# Patient Record
Sex: FEMALE | Race: OTHER | HISPANIC OR LATINO | Employment: FULL TIME | URBAN - METROPOLITAN AREA
[De-identification: names, ages, dates, MRNs, and addresses within clinical notes are randomized per-mention and may not be internally consistent; named-entity substitution may affect disease eponyms.]

---

## 2017-01-03 ENCOUNTER — NON-PROVIDER VISIT (OUTPATIENT)
Dept: MEDICAL GROUP | Facility: MEDICAL CENTER | Age: 37
End: 2017-01-03
Payer: COMMERCIAL

## 2017-01-03 DIAGNOSIS — Z30.42 ENCOUNTER FOR MANAGEMENT AND INJECTION OF INJECTABLE PROGESTIN CONTRACEPTIVE: ICD-10-CM

## 2017-01-03 PROCEDURE — 96372 THER/PROPH/DIAG INJ SC/IM: CPT | Performed by: NURSE PRACTITIONER

## 2017-01-03 RX ORDER — MEDROXYPROGESTERONE ACETATE 150 MG/ML
150 INJECTION, SUSPENSION INTRAMUSCULAR ONCE
Status: COMPLETED | OUTPATIENT
Start: 2017-01-03 | End: 2017-01-03

## 2017-01-03 RX ADMIN — MEDROXYPROGESTERONE ACETATE 150 MG: 150 INJECTION, SUSPENSION INTRAMUSCULAR at 15:59

## 2017-01-03 NOTE — MR AVS SNAPSHOT
Christal Roblero   1/3/2017 2:30 PM   Appointment   MRN: [id number]    Department:  [address]   Dept Phone:  [phone]    Description:  Female : 1980   Provider:  [facility]           Allergies as of 1/3/2017     Allergen Noted Reactions    Nkda [No Known Drug Allergy] 2013         Vital Signs     Smoking Status                   Never Smoker            Basic Information     Date Of Birth Sex Race Ethnicity Preferred Language    1980 Female  or   Origin (Romanian,Ethiopian,Macedonian,Singaporean, etc) English      Your appointments     2017  1:20 PM   [facility] Visit with [facility]   [facility] (--)    [address], [address]  [facility] [address] [phone]           You will be receiving a confirmation call a few days before your appointment from our automated call confirmation system.              Problem List              ICD-10-CM Priority Class Noted - Resolved    Personal history of gout Z87.39   2011 - Present    Anxiety disorder F41.9   2013 - Present    Intractable migraine without aura and without status migrainosus G43.019   2015 - Present      R47.01   9/3/2015 - Present    Hypotension I95.9   2015 - Present    Migraine with aura and without status migrainosus, not intractable G43.109   2016 - Present    Low back pain M54.5   5/10/2016 - Present    Encounter for management and injection of injectable progestin contraceptive Z30.42   10/4/2016 - Present    Chronic migraine G43.709   2016 - Present      Health Maintenance        Date Due Completion Dates    IMM DTaP/Tdap/Td Vaccine (1 - Tdap) 2011    IMM INFLUENZA (1) 2016 ---    PAP SMEAR 2018, 2013 (Done)    [facility]            Current Immunizations     TD Vaccine 2011      Below and/or attached are the medications your provider expects you to  take. Review all of your home medications and newly ordered medications with your provider and/or pharmacist. Follow medication instructions as directed by your provider and/or pharmacist. Please keep your medication list with you and share with your provider. Update the information when medications are discontinued, doses are changed, or new medications (including over-the-counter products) are added; and carry medication information at all times in the event of emergency situations     Allergies:  NKDA - (reactions not documented)               Medications  Valid as of: January 03, 2017 -  2:58 PM    Generic Name Brand Name Tablet Size Instructions for use    ALPRAZolam (Tab) XANAX 0.5 MG Take 1 Tab by mouth 2 times a day as needed for Anxiety (panic attacks).        Amitriptyline HCl (Tab) ELAVIL 50 MG 1 tab in evening for headache        Butalbital-APAP-Caff-Cod (Cap) FIORICET W/CODEINE -04-30 MG Take 1 Cap by mouth every four hours as needed for Headache.        Butalbital-APAP-Caff-Cod (Cap) FIORICET W/CODEINE -30-30 MG Take 1 Cap by mouth every four hours as needed for Headache.        Hydrocodone-Acetaminophen (Tab) NORCO 7.5-325 MG Take 1 Tab by mouth every 6 hours as needed.        MethylPREDNISolone (Kit) MEDROL DOSEPACK 4 MG Take as directed        MethylPREDNISolone (Tablet Therapy Pack) MEDROL DOSEPAK 4 MG Take as directed        PredniSONE (Tab) PredniSONE (Rosalio) 5 MG Take as directed for taper        PredniSONE (Tablet Therapy Pack) PredniSONE 5 MG (48) Take 1 Tab by mouth See Admin Instructions.        Promethazine HCl (Tab) PHENERGAN 25 MG 25 mg phenergan +/- 25 mg benadryl +/- excedrin migraine.  Max one phenergan per day.        QUEtiapine Fumarate (Tab) SEROQUEL 50 MG TAKE THREE TABLETS BY MOUTH ONCE DAILY AT BEDTIME        QUEtiapine Fumarate (Tab) SEROQUEL 50 MG Take 3 Tabs by mouth every bedtime.        Venlafaxine HCl (CAPSULE SR 24 HR) EFFEXOR- MG TAKE ONE CAPSULE BY MOUTH  ONCE DAILY        .                 Medicines prescribed today were sent to:     [facility] [address] [place] [address], [place] - [address] [facility]    [address] [facility] [place] [address]    Phone: [phone] Fax: [phone]    Open 24 Hours?: No      Medication refill instructions:       If your prescription bottle indicates you have medication refills left, it is not necessary to call your provider’s office. Please contact your pharmacy and they will refill your medication.    If your prescription bottle indicates you do not have any refills left, you may request refills at any time through one of the following ways: The online TATE'S LIST system (except Urgent Care), by calling your provider’s office, or by asking your pharmacy to contact your provider’s office with a refill request. Medication refills are processed only during regular business hours and may not be available until the next business day. Your provider may request additional information or to have a follow-up visit with you prior to refilling your medication.   *Please Note: Medication refills are assigned a new Rx number when refilled electronically. Your pharmacy may indicate that no refills were authorized even though a new prescription for the same medication is available at the pharmacy. Please request the medicine by name with the pharmacy before contacting your provider for a refill.           TATE'S LIST Access Code: Activation code not generated  Current TATE'S LIST Status: Active

## 2017-01-04 NOTE — PROGRESS NOTES
Christal Roblero is a 36 y.o. here for a Depo Provera Injection.     Date of last Depo Provera Injection: 10/4/16  Current date within therapeutic range?: Yes   Urine pregnancy test done (needed if out of date range): no  Date of office visit:11/3/16  Date of last pap (if > 21 years old)/ GYN exam: 7/20/15  Dx: Contraceptive use    Patient tolerated injection and no adverse effects were observed or reported yes.    # of Administrations remaining in MAR:0  Next injection due between 3/21/17 and 4/4/17.

## 2017-01-10 ENCOUNTER — TELEPHONE (OUTPATIENT)
Dept: NEUROLOGY | Facility: MEDICAL CENTER | Age: 37
End: 2017-01-10

## 2017-01-10 NOTE — TELEPHONE ENCOUNTER
----- Message from Afua Roblero PA-C sent at 1/5/2017  2:36 PM PST -----  Regarding: FW: Referral  Can you call the patient and advise that the patient needs to have their PCP recheck inflammatory markers and if they are elevated then Dr. Maldonado will re-evaluate but I cannot make referral at this time - it is declined by Dr. Maldonado.     ----- Message -----     From: Carina Gonsalez     Sent: 1/5/2017   2:00 PM       To: Afua Roblero PA-C  Subject: Referral                                         Good afternoon Afua Roblero.    I wanted to notify you that the referral for the above pt to [facility] has been declined at this time.  Dr. Maldonado reviewed the records and is recommending to re-check inflammatory markers.  If they are elevated, she will be happy to re-evaluate.  If you have any additional questions, please reach out to one of our providers. Thank you for the referral.

## 2017-02-17 DIAGNOSIS — F41.1 GENERALIZED ANXIETY DISORDER: ICD-10-CM

## 2017-02-21 RX ORDER — VENLAFAXINE HYDROCHLORIDE 150 MG/1
CAPSULE, EXTENDED RELEASE ORAL
Qty: 90 CAP | Refills: 0 | Status: SHIPPED | OUTPATIENT
Start: 2017-02-21 | End: 2017-07-03 | Stop reason: SDUPTHER

## 2017-02-21 RX ORDER — ALPRAZOLAM 0.5 MG/1
0.5 TABLET ORAL 2 TIMES DAILY PRN
Qty: 15 TAB | Refills: 0 | Status: SHIPPED
Start: 2017-02-21 | End: 2017-07-20 | Stop reason: SDUPTHER

## 2017-02-21 NOTE — TELEPHONE ENCOUNTER
Was the patient seen in the last year in this department? Yes  Last Fill 9/7/16 #30    Does patient have an active prescription for medications requested? No     Received Request Via: Pharmacy

## 2017-03-03 ENCOUNTER — OFFICE VISIT (OUTPATIENT)
Dept: NEUROLOGY | Facility: MEDICAL CENTER | Age: 37
End: 2017-03-03
Payer: COMMERCIAL

## 2017-03-03 VITALS
WEIGHT: 224 LBS | TEMPERATURE: 97.9 F | SYSTOLIC BLOOD PRESSURE: 114 MMHG | OXYGEN SATURATION: 93 % | BODY MASS INDEX: 37.32 KG/M2 | HEIGHT: 65 IN | DIASTOLIC BLOOD PRESSURE: 80 MMHG | HEART RATE: 96 BPM

## 2017-03-03 DIAGNOSIS — G43.019 INTRACTABLE MIGRAINE WITHOUT AURA AND WITHOUT STATUS MIGRAINOSUS: ICD-10-CM

## 2017-03-03 DIAGNOSIS — Z13.9 SCREENING: ICD-10-CM

## 2017-03-03 PROCEDURE — 99214 OFFICE O/P EST MOD 30 MIN: CPT | Performed by: PHYSICIAN ASSISTANT

## 2017-03-03 NOTE — MR AVS SNAPSHOT
"        Christal Damien Osbaldo   3/3/2017 11:00 AM   Office Visit   MRN: [id number]    Department:  Neurology [facility]   Dept Phone:  [phone]    Description:  Female : 1980   Provider:  Afua Roblero PA-C           Reason for Visit     Follow-Up migraines      Allergies as of 3/3/2017     Allergen Noted Reactions    Nkda [No Known Drug Allergy] 2013         You were diagnosed with     Screening   [441665]       Intractable migraine without aura and without status migrainosus   [918032]         Vital Signs     Blood Pressure Pulse Temperature Height Weight Body Mass Index    114/80 mmHg 96 36.6 °C (97.9 °F) 1.651 m (5' 5\") 101.606 kg (224 lb) 37.28 kg/m2    Oxygen Saturation Smoking Status                93% Never Smoker           Basic Information     Date Of Birth Sex Race Ethnicity Preferred Language    1980 Female  or   Origin (Thai,Citizen of the [place],Namibian,Malagasy, etc) English      Your appointments     2017  2:00 PM   Follow [facility] Visit [facility]   [facility] (--)    [address] [place] [address], [address]  [facility] [id number] [phone]           You will be receiving a confirmation call a few days before your appointment from our automated call confirmation system.              Problem List              ICD-10-CM Priority Class Noted - Resolved    Personal history of gout Z87.39   2011 - Present    Anxiety disorder F41.9   2013 - Present    Intractable migraine without aura and without status migrainosus G43.019   2015 - Present      R47.01   9/3/2015 - Present    Hypotension I95.9   2015 - Present    Migraine with aura and without status migrainosus, not intractable G43.109   2016 - Present    Low back pain M54.5   5/10/2016 - Present    Encounter for management and injection of injectable progestin contraceptive Z30.42   10/4/2016 - Present    Chronic migraine G43.709   2016 - Present      Health Maintenance   "       Date Due Completion Dates    IMM DTaP/Tdap/Td Vaccine (1 - Tdap) 8/1/2011 7/31/2011    IMM INFLUENZA (1) 9/1/2016 ---    PAP SMEAR 7/20/2018 7/20/2015, 6/24/2013 (Done)    [facility]            Current Immunizations     TD Vaccine 7/31/2011      Below and/or attached are the medications your provider expects you to take. Review all of your home medications and newly ordered medications with your provider and/or pharmacist. Follow medication instructions as directed by your provider and/or pharmacist. Please keep your medication list with you and share with your provider. Update the information when medications are discontinued, doses are changed, or new medications (including over-the-counter products) are added; and carry medication information at all times in the event of emergency situations     Allergies:  NKDA - (reactions not documented)               Medications  Valid as of: March 03, 2017 -  1:41 PM    Generic Name Brand Name Tablet Size Instructions for use    ALPRAZolam (Tab) XANAX 0.5 MG Take 1 Tab by mouth 2 times a day as needed for Anxiety (panic attacks).        Amitriptyline HCl (Tab) ELAVIL 50 MG 1 tab in evening for headache        Butalbital-APAP-Caff-Cod (Cap) FIORICET W/CODEINE -54-30 MG Take 1 Cap by mouth every four hours as needed for Headache.        Butalbital-APAP-Caff-Cod (Cap) FIORICET W/CODEINE -28-30 MG Take 1 Cap by mouth every four hours as needed for Headache.        Hydrocodone-Acetaminophen (Tab) NORCO 7.5-325 MG Take 1 Tab by mouth every 6 hours as needed.        MethylPREDNISolone (Kit) MEDROL DOSEPACK 4 MG Take as directed        MethylPREDNISolone (Tablet Therapy Pack) MEDROL DOSEPAK 4 MG Take as directed        PredniSONE (Tab) PredniSONE (Rosalio) 5 MG Take as directed for taper        PredniSONE (Tablet Therapy Pack) PredniSONE 5 MG (48) Take 1 Tab by mouth See Admin Instructions.        Promethazine HCl (Tab) PHENERGAN  25 MG 25 mg phenergan +/- 25 mg benadryl +/- excedrin migraine.  Max one phenergan per day.        QUEtiapine Fumarate (Tab) SEROQUEL 50 MG TAKE THREE TABLETS BY MOUTH ONCE DAILY AT BEDTIME        QUEtiapine Fumarate (Tab) SEROQUEL 50 MG Take 3 Tabs by mouth every bedtime.        SUMAtriptan Succinate (Solution Auto-injector) SUMAtriptan Succinate 3 MG/0.5ML Inject 1 Application as instructed as needed.        Venlafaxine HCl (CAPSULE SR 24 HR) EFFEXOR- MG TAKE ONE CAPSULE BY MOUTH ONCE DAILY        .                 Medicines prescribed today were sent to:     [facility] [address] [place] [address] - [address] [place] [address] [facility] [address]    Phone: [phone] Fax: [phone]    Open 24 Hours?: No    [facility] [address] [place] [address]    [address] [address]    Phone: [phone] Fax: [phone]    Open 24 Hours?: No      Medication refill instructions:       If your prescription bottle indicates you have medication refills left, it is not necessary to call your provider’s office. Please contact your pharmacy and they will refill your medication.    If your prescription bottle indicates you do not have any refills left, you may request refills at any time through one of the following ways: The online [employer] system (except Urgent Care), by calling your provider’s office, or by asking your pharmacy to contact your provider’s office with a refill request. Medication refills are processed only during regular business hours and may not be available until the next business day. Your provider may request additional information or to have a follow-up visit with you prior to refilling your medication.   *Please Note: Medication refills are assigned a new Rx number when refilled electronically. Your pharmacy may indicate that no refills were authorized even though a new prescription for the same medication is available at the pharmacy. Please request the  medicine by name with the pharmacy before contacting your provider for a refill.        Your To Do List     Future Labs/Procedures Complete By Expires    EKG  As directed 3/3/2018    EKG  As directed 3/3/2018         MyChart Access Code: Activation code not generated  Current [employer] Status: Active

## 2017-03-03 NOTE — PROGRESS NOTES
"Subjective:      Christal Roblero is a 36 y.o. female who presents with Follow-Up  cc:  Headaches/migraines    Elavil - doesn't seem to really be working.  Cut back now to just 25 mg at bedtime.  Continues to struggle with too many headaches and elavil side effects.    We have tried to prescribe multiple abortive therapies but patient can never afford them as she says they are too expensive.      Lengthy discussion with patient about need to come up with a plan as narcotics are not the answer and she is just making her self miserable with development of refractory headaches.    She is willing to try again to find daily medication because what are our options??            HPI    ROS       Objective:     /80 mmHg  Pulse 96  Temp(Src) 36.6 °C (97.9 °F)  Ht 1.651 m (5' 5\")  Wt 101.606 kg (224 lb)  BMI 37.28 kg/m2  SpO2 93%     Physical Exam            Assessment/Plan:   Migraine with and without aura/medication overuse headache/chronic migraine    EKG first and if OK we will start verapamil  Try zembrace if insurance will authorize - if not migranal 3 sprays through outside pharmacy    Total time with this visit:  25   Minutes face-to-face with patient. More than 50% of this visit was spent educating patient on their illness and/or coordinating care, as detailed above        "

## 2017-03-24 ENCOUNTER — HOSPITAL ENCOUNTER (OUTPATIENT)
Dept: CARDIOLOGY | Facility: MEDICAL CENTER | Age: 37
End: 2017-03-24
Attending: PHYSICIAN ASSISTANT
Payer: COMMERCIAL

## 2017-03-24 LAB — EKG IMPRESSION: NORMAL

## 2017-03-24 PROCEDURE — 93005 ELECTROCARDIOGRAM TRACING: CPT | Performed by: PHYSICIAN ASSISTANT

## 2017-03-24 PROCEDURE — 93010 ELECTROCARDIOGRAM REPORT: CPT | Performed by: INTERNAL MEDICINE

## 2017-03-28 ENCOUNTER — PATIENT MESSAGE (OUTPATIENT)
Dept: NEUROLOGY | Facility: MEDICAL CENTER | Age: 37
End: 2017-03-28

## 2017-03-28 DIAGNOSIS — G43.019 INTRACTABLE MIGRAINE WITHOUT AURA AND WITHOUT STATUS MIGRAINOSUS: ICD-10-CM

## 2017-03-28 RX ORDER — VERAPAMIL HYDROCHLORIDE 40 MG/1
40 TABLET ORAL 2 TIMES DAILY
Qty: 60 TAB | Refills: 11 | Status: SHIPPED | OUTPATIENT
Start: 2017-03-28 | End: 2017-04-17

## 2017-03-28 NOTE — TELEPHONE ENCOUNTER
From: Christal Roblero  To: Afua Roblero PA-C  Sent: 3/28/2017 2:51 PM PDT  Subject: RE: Prescription Question    Sounds good will you be sending the prescription to the [facility] pharmacy or do I go  the prescription from you?    ----- Message -----  From: Afua Roblero PA-C  Sent: 3/28/17, 2:36 PM  To: Christal Roblero  Subject: RE: Prescription Question    EKG was just fine.    We will start verapamil 40 mg twice daily (AM and PM). We will slowly titrate as needed but this is starting dose. Begin keeping headache diary and remember goal of daily med is to reduce headache frequency by 50%.    Peg      ----- Message -----   From: CHRISTAL ROBLERO   Sent: 3/28/2017 9:12 AM PDT   To: Afua Roblero PA-C  Subject: Prescription Question    Good morning I got my EKG on Friday. Not sure if you got the results yet so I can start the new medication. Please let me know if I need to do anything for it.

## 2017-03-31 ENCOUNTER — NON-PROVIDER VISIT (OUTPATIENT)
Dept: MEDICAL GROUP | Facility: LAB | Age: 37
End: 2017-03-31
Payer: COMMERCIAL

## 2017-03-31 DIAGNOSIS — Z30.42 ENCOUNTER FOR MANAGEMENT AND INJECTION OF INJECTABLE PROGESTIN CONTRACEPTIVE: ICD-10-CM

## 2017-03-31 PROCEDURE — 96372 THER/PROPH/DIAG INJ SC/IM: CPT | Performed by: FAMILY MEDICINE

## 2017-03-31 RX ORDER — MEDROXYPROGESTERONE ACETATE 150 MG/ML
150 INJECTION, SUSPENSION INTRAMUSCULAR ONCE
Status: COMPLETED | OUTPATIENT
Start: 2017-03-31 | End: 2017-03-31

## 2017-03-31 RX ADMIN — MEDROXYPROGESTERONE ACETATE 150 MG: 150 INJECTION, SUSPENSION INTRAMUSCULAR at 11:25

## 2017-03-31 NOTE — MR AVS SNAPSHOT
Christal Roblero   3/31/2017 10:40 AM   Non-Provider Visit   MRN: [id number]    Department:  [facility]   Dept Phone:  [phone]    Description:  Female : 1980   Provider:  JUICE PATEL MA           Reason for Visit     Injection Depo      Allergies as of 3/31/2017     Allergen Noted Reactions    Nkda [No Known Drug Allergy] 2013         You were diagnosed with     Encounter for management and injection of injectable progestin contraceptive   [548005]         Vital Signs     Smoking Status                   Never Smoker            Basic Information     Date Of Birth Sex Race Ethnicity Preferred Language    1980 Female  or   Origin (Anguillan,Tunisian,Citizen of [place],Greek, etc) English      Your appointments     2017  2:00 PM   [facility]   [facility] (--)    [address] [facility], [address]  [facility] [address] [phone]           You will be receiving a confirmation call a few days before your appointment from our automated call confirmation system.              Problem List              ICD-10-CM Priority Class Noted - Resolved    Personal history of gout Z87.39   2011 - Present    Anxiety disorder F41.9   2013 - Present    Intractable migraine without aura and without status migrainosus G43.019   2015 - Present      R47.01   9/3/2015 - Present    Hypotension I95.9   2015 - Present    Migraine with aura and without status migrainosus, not intractable G43.109   2016 - Present    Low back pain M54.5   5/10/2016 - Present    Encounter for management and injection of injectable progestin contraceptive Z30.42   10/4/2016 - Present    Chronic migraine G43.709   2016 - Present      Health Maintenance        Date Due Completion Dates    IMM DTaP/Tdap/Td Vaccine (1 - Tdap) 2011    IMM INFLUENZA (1) 2016 ---    PAP SMEAR 2018, 2013 (Done)    [facility]            Current Immunizations     TD Vaccine 7/31/2011      Below and/or attached are the medications your provider expects you to take. Review all of your home medications and newly ordered medications with your provider and/or pharmacist. Follow medication instructions as directed by your provider and/or pharmacist. Please keep your medication list with you and share with your provider. Update the information when medications are discontinued, doses are changed, or new medications (including over-the-counter products) are added; and carry medication information at all times in the event of emergency situations     Allergies:  NKDA - (reactions not documented)               Medications  Valid as of: March 31, 2017 - 11:29 AM    Generic Name Brand Name Tablet Size Instructions for use    ALPRAZolam (Tab) XANAX 0.5 MG Take 1 Tab by mouth 2 times a day as needed for Anxiety (panic attacks).        Amitriptyline HCl (Tab) ELAVIL 50 MG 1 tab in evening for headache        Butalbital-APAP-Caff-Cod (Cap) FIORICET W/CODEINE -82-30 MG Take 1 Cap by mouth every four hours as needed for Headache.        Butalbital-APAP-Caff-Cod (Cap) FIORICET W/CODEINE -09-30 MG Take 1 Cap by mouth every four hours as needed for Headache.        Hydrocodone-Acetaminophen (Tab) NORCO 7.5-325 MG Take 1 Tab by mouth every 6 hours as needed.        MethylPREDNISolone (Kit) MEDROL DOSEPACK 4 MG Take as directed        MethylPREDNISolone (Tablet Therapy Pack) MEDROL DOSEPAK 4 MG Take as directed        PredniSONE (Tab) PredniSONE (Rosalio) 5 MG Take as directed for taper        PredniSONE (Tablet Therapy Pack) PredniSONE 5 MG (48) Take 1 Tab by mouth See Admin Instructions.        Promethazine HCl (Tab) PHENERGAN 25 MG 25 mg phenergan +/- 25 mg benadryl +/- excedrin migraine.  Max one phenergan per day.        QUEtiapine Fumarate (Tab) SEROQUEL 50 MG TAKE THREE TABLETS BY MOUTH ONCE DAILY  AT BEDTIME        QUEtiapine Fumarate (Tab) SEROQUEL 50 MG Take 3 Tabs by mouth every bedtime.        SUMAtriptan Succinate (Solution Auto-injector) SUMAtriptan Succinate 3 MG/0.5ML Inject 1 Application as instructed as needed.        Venlafaxine HCl (CAPSULE SR 24 HR) EFFEXOR- MG TAKE ONE CAPSULE BY MOUTH ONCE DAILY        Verapamil HCl (Tab) ISOPTIN 40 MG Take 1 Tab by mouth 2 Times a Day. For migraine prevention.        .                 Medicines prescribed today were sent to:     [facility] [address] [place], [place] - [address] [facility]    [address] [facility] [address]    Phone: [phone] Fax: [phone]    Open 24 Hours?: No    [facility] - [place], [place] - [address] [facility]    [address] [address]    Phone: [phone] Fax: [phone]    Open 24 Hours?: No      Medication refill instructions:       If your prescription bottle indicates you have medication refills left, it is not necessary to call your provider’s office. Please contact your pharmacy and they will refill your medication.    If your prescription bottle indicates you do not have any refills left, you may request refills at any time through one of the following ways: The online [employer] system (except Urgent Care), by calling your provider’s office, or by asking your pharmacy to contact your provider’s office with a refill request. Medication refills are processed only during regular business hours and may not be available until the next business day. Your provider may request additional information or to have a follow-up visit with you prior to refilling your medication.   *Please Note: Medication refills are assigned a new Rx number when refilled electronically. Your pharmacy may indicate that no refills were authorized even though a new prescription for the same medication is available at the pharmacy. Please request the medicine by name with the pharmacy before contacting your provider for a  refill.           MyChart Access Code: Activation code not generated  Current Organizer Status: Active

## 2017-03-31 NOTE — PROGRESS NOTES
Christal Roblero is a 36 y.o. here for a Depo Provera Injection.     Date of last Depo Provera Injection: contraception  Current date within therapeutic range?: Yes   Urine pregnancy test done (needed if out of date range): no  Date of office visit:3-31-17  Date of last pap (if > 21 years old)/ GYN exam: 7-20-15  Dx: Contraceptive use    Patient tolerated injection and no adverse effects were observed or reported.    # of Administrations remaining in MAR:0  Next injection due between 6-16-17 and 6-30-17.

## 2017-04-04 ENCOUNTER — TELEPHONE (OUTPATIENT)
Dept: MEDICAL GROUP | Facility: LAB | Age: 37
End: 2017-04-04

## 2017-04-11 ENCOUNTER — HOSPITAL ENCOUNTER (OUTPATIENT)
Dept: RADIOLOGY | Facility: MEDICAL CENTER | Age: 37
End: 2017-04-11
Attending: NURSE PRACTITIONER
Payer: COMMERCIAL

## 2017-04-11 DIAGNOSIS — M54.16 LUMBAR RADICULOPATHY: ICD-10-CM

## 2017-04-11 PROCEDURE — 72110 X-RAY EXAM L-2 SPINE 4/>VWS: CPT

## 2017-04-17 ENCOUNTER — OFFICE VISIT (OUTPATIENT)
Dept: NEUROLOGY | Facility: MEDICAL CENTER | Age: 37
End: 2017-04-17
Payer: COMMERCIAL

## 2017-04-17 VITALS
HEIGHT: 65 IN | DIASTOLIC BLOOD PRESSURE: 84 MMHG | SYSTOLIC BLOOD PRESSURE: 120 MMHG | TEMPERATURE: 98.2 F | BODY MASS INDEX: 37.15 KG/M2 | OXYGEN SATURATION: 97 % | WEIGHT: 223 LBS | HEART RATE: 89 BPM

## 2017-04-17 DIAGNOSIS — G43.109 MIGRAINE WITH AURA AND WITHOUT STATUS MIGRAINOSUS, NOT INTRACTABLE: ICD-10-CM

## 2017-04-17 PROCEDURE — 99213 OFFICE O/P EST LOW 20 MIN: CPT | Performed by: PHYSICIAN ASSISTANT

## 2017-04-17 RX ORDER — SUMATRIPTAN SUCCINATE 4 MG/.5ML
1 INJECTION, SOLUTION SUBCUTANEOUS PRN
Qty: 3 ML | Refills: 11 | Status: SHIPPED | OUTPATIENT
Start: 2017-04-17 | End: 2017-11-29 | Stop reason: SDUPTHER

## 2017-04-17 RX ORDER — VERAPAMIL HYDROCHLORIDE 40 MG/1
TABLET ORAL
Qty: 120 TAB | Refills: 11 | Status: SHIPPED | OUTPATIENT
Start: 2017-04-17 | End: 2017-11-29

## 2017-04-17 NOTE — MR AVS SNAPSHOT
"        Chrsital Quezada Roblero   2017 2:00 PM   Office Visit   MRN: [id number]    Department:  [facility]   Dept Phone:  [phone]    Description:  Female : 1980   Provider:  Afua Roblero PA-C           Reason for Visit     Follow-Up migraine      Allergies as of 2017     Allergen Noted Reactions    Nkda [No Known Drug Allergy] 2013         You were diagnosed with     Migraine with aura and without status migrainosus, not intractable   [345939]       Chronic migraine   [207305]         Vital Signs     Blood Pressure Pulse Temperature Height Weight Body Mass Index    120/84 mmHg 89 36.8 °C (98.2 °F) 1.651 m (5' 5\") 101.152 kg (223 lb) 37.11 kg/m2    Oxygen Saturation Smoking Status                97% Never Smoker           Basic Information     Date Of Birth Sex Race Ethnicity Preferred Language    1980 Female  or   Origin (Polish,[place]ican,Citizen of [place], etc) English      Your appointments     2017 11:40 AM   Follow Up Visit with Afua Roblero PA-C   [facility] (--)    [address] [place] [address], [address]  [facility] [id number] [phone]           You will be receiving a confirmation call a few days before your appointment from our automated call confirmation system.              Problem List              ICD-10-CM Priority Class Noted - Resolved    Personal history of gout Z87.39   2011 - Present    Anxiety disorder F41.9   2013 - Present    Intractable migraine without aura and without status migrainosus G43.019   2015 - Present      R47.01   9/3/2015 - Present    Hypotension I95.9   2015 - Present    Migraine with aura and without status migrainosus, not intractable G43.109   2016 - Present    Low back pain M54.5   5/10/2016 - Present    Encounter for management and injection of injectable progestin contraceptive Z30.42   10/4/2016 - Present    Chronic migraine G43.709   2016 - Present      Health " Maintenance        Date Due Completion Dates    IMM DTaP/Tdap/Td Vaccine (1 - Tdap) 8/1/2011 7/31/2011    PAP SMEAR 7/20/2018 7/20/2015, 6/24/2013 (Done)    [facility]            Current Immunizations     TD Vaccine 7/31/2011      Below and/or attached are the medications your provider expects you to take. Review all of your home medications and newly ordered medications with your provider and/or pharmacist. Follow medication instructions as directed by your provider and/or pharmacist. Please keep your medication list with you and share with your provider. Update the information when medications are discontinued, doses are changed, or new medications (including over-the-counter products) are added; and carry medication information at all times in the event of emergency situations     Allergies:  NKDA - (reactions not documented)               Medications  Valid as of: April 17, 2017 -  2:15 PM    Generic Name Brand Name Tablet Size Instructions for use    ALPRAZolam (Tab) XANAX 0.5 MG Take 1 Tab by mouth 2 times a day as needed for Anxiety (panic attacks).        Amitriptyline HCl (Tab) ELAVIL 50 MG 1 tab in evening for headache        Butalbital-APAP-Caff-Cod (Cap) FIORICET W/CODEINE -32-30 MG Take 1 Cap by mouth every four hours as needed for Headache.        Butalbital-APAP-Caff-Cod (Cap) FIORICET W/CODEINE -80-30 MG Take 1 Cap by mouth every four hours as needed for Headache.        Hydrocodone-Acetaminophen (Tab) NORCO 7.5-325 MG Take 1 Tab by mouth every 6 hours as needed.        MethylPREDNISolone (Kit) MEDROL DOSEPACK 4 MG Take as directed        MethylPREDNISolone (Tablet Therapy Pack) MEDROL DOSEPAK 4 MG Take as directed        PredniSONE (Tab) PredniSONE (Rosalio) 5 MG Take as directed for taper        PredniSONE (Tablet Therapy Pack) PredniSONE 5 MG (48) Take 1 Tab by mouth See Admin Instructions.        Promethazine HCl (Tab) PHENERGAN 25 MG 25 mg phenergan  +/- 25 mg benadryl +/- excedrin migraine.  Max one phenergan per day.        QUEtiapine Fumarate (Tab) SEROQUEL 50 MG TAKE THREE TABLETS BY MOUTH ONCE DAILY AT BEDTIME        QUEtiapine Fumarate (Tab) SEROQUEL 50 MG Take 3 Tabs by mouth every bedtime.        SUMAtriptan Succinate (Solution Auto-injector) SUMAtriptan Succinate 4 MG/0.5ML Inject 1 Syringe as instructed as needed.        Venlafaxine HCl (CAPSULE SR 24 HR) EFFEXOR- MG TAKE ONE CAPSULE BY MOUTH ONCE DAILY        Verapamil HCl (Tab) ISOPTIN 40 MG One tab PM and 2 tabs AM x 2 weeks; then 2 tabs AM and 2 tabs PM.        .                 Medicines prescribed today were sent to:     [facility] [address] [place], [place] [address] [address] [address]    Phone: [phone] Fax: [phone]    Open 24 Hours?: No    [facility] [place] [address]    [address] [address]    Phone: [phone] Fax: [phone]    Open 24 Hours?: No      Medication refill instructions:       If your prescription bottle indicates you have medication refills left, it is not necessary to call your provider’s office. Please contact your pharmacy and they will refill your medication.    If your prescription bottle indicates you do not have any refills left, you may request refills at any time through one of the following ways: The online [employer] system (except Urgent Care), by calling your provider’s office, or by asking your pharmacy to contact your provider’s office with a refill request. Medication refills are processed only during regular business hours and may not be available until the next business day. Your provider may request additional information or to have a follow-up visit with you prior to refilling your medication.   *Please Note: Medication refills are assigned a new Rx number when refilled electronically. Your pharmacy may indicate that no refills were authorized even though a new  prescription for the same medication is available at the pharmacy. Please request the medicine by name with the pharmacy before contacting your provider for a refill.           MyChart Access Code: Activation code not generated  Current MyChart Status: Active

## 2017-05-03 RX ORDER — QUETIAPINE FUMARATE 50 MG/1
TABLET, FILM COATED ORAL
Qty: 90 TAB | Refills: 0 | Status: SHIPPED | OUTPATIENT
Start: 2017-05-03 | End: 2017-07-05

## 2017-05-08 ENCOUNTER — OFFICE VISIT (OUTPATIENT)
Dept: MEDICAL GROUP | Facility: LAB | Age: 37
End: 2017-05-08
Payer: COMMERCIAL

## 2017-05-08 VITALS
TEMPERATURE: 99.2 F | HEART RATE: 80 BPM | BODY MASS INDEX: 37.99 KG/M2 | WEIGHT: 228 LBS | DIASTOLIC BLOOD PRESSURE: 82 MMHG | SYSTOLIC BLOOD PRESSURE: 118 MMHG | RESPIRATION RATE: 12 BRPM | HEIGHT: 65 IN | OXYGEN SATURATION: 95 %

## 2017-05-08 DIAGNOSIS — H92.01 OTALGIA, RIGHT EAR: ICD-10-CM

## 2017-05-08 DIAGNOSIS — H61.23 BILATERAL IMPACTED CERUMEN: ICD-10-CM

## 2017-05-08 DIAGNOSIS — R05.8 NON-PRODUCTIVE COUGH: ICD-10-CM

## 2017-05-08 PROCEDURE — 99214 OFFICE O/P EST MOD 30 MIN: CPT | Performed by: FAMILY MEDICINE

## 2017-05-08 RX ORDER — AMOXICILLIN AND CLAVULANATE POTASSIUM 875; 125 MG/1; MG/1
1 TABLET, FILM COATED ORAL 2 TIMES DAILY
Qty: 20 TAB | Refills: 0 | Status: SHIPPED | OUTPATIENT
Start: 2017-05-08 | End: 2017-07-05

## 2017-05-08 RX ORDER — BENZONATATE 100 MG/1
100 CAPSULE ORAL 3 TIMES DAILY PRN
Qty: 60 CAP | Refills: 0 | Status: SHIPPED | OUTPATIENT
Start: 2017-05-08 | End: 2017-07-05

## 2017-05-08 ASSESSMENT — ENCOUNTER SYMPTOMS
RHINORRHEA: 0
HEADACHES: 1
COUGH: 1
SHORTNESS OF BREATH: 0

## 2017-05-08 ASSESSMENT — COPD QUESTIONNAIRES: COPD: 0

## 2017-05-08 NOTE — PROGRESS NOTES
Subjective:      Christal Roblero is a 36 y.o. female who presents with Cough and Nasal Congestion    Chief Complaint   Patient presents with   • Cough     x 4 days   • Nasal Congestion     x 4 days           Cough  This is a new problem. The current episode started in the past 7 days. The problem has been gradually worsening. The cough is non-productive. Associated symptoms include ear pain (right one ) and headaches. Pertinent negatives include no rhinorrhea or shortness of breath. Associated symptoms comments: Sinus congestion . The symptoms are aggravated by lying down. Treatments tried: robitussin dayquil, cough drops  The treatment provided no relief. There is no history of asthma, COPD or emphysema.     Patient is here with above. States her symptoms started on Thursday with a sore throat. By Friday midday she had a cough. She then developed sinus congestion the next day. She states that last night her ears started to pop and her right ear is a little ball painful. She states that her cough is nonproductive. She is not sure if she's had a fever or not because she is always cold. Her sore throat is gone but her throat is irritated when she coughs. Did have some diarrhea yesterday however this resolved.     Son sick last week with similar     No flu vaccine     Patient is a lifelong nonsmoker    Patient Active Problem List    Diagnosis Date Noted   • Chronic migraine 12/19/2016   • Encounter for management and injection of injectable progestin contraceptive 10/04/2016   • Low back pain 05/10/2016   • Migraine with aura and without status migrainosus, not intractable 04/22/2016   • Hypotension 09/07/2015   •   09/03/2015   • Intractable migraine without aura and without status migrainosus 08/25/2015   • Anxiety disorder 02/05/2013   • Personal history of gout 05/17/2011       Family History   Problem Relation Age of Onset   • Heart Disease Mother      irregular heartbeat   • Thyroid Sister      hypo        Social History     Social History   • Marital Status:      Spouse Name: N/A   • Number of Children: N/A   • Years of Education: N/A     Occupational History   • Not on file.     Social History Main Topics   • Smoking status: Never Smoker    • Smokeless tobacco: Never Used   • Alcohol Use: Yes      Comment: rare; on occasion    • Drug Use: No   • Sexual Activity:     Partners: Male     Birth Control/ Protection: Injection      Comment: ; two kids; wk: 2 jobs ( / warehouse)     Other Topics Concern   • Not on file     Social History Narrative       Current outpatient prescriptions:   •  quetiapine (SEROQUEL) 50 MG tablet, TAKE THREE TABLETS BY MOUTH ONCE DAILY AT BEDTIME, Disp: 90 Tab, Rfl: 0  •  SUMAtriptan Succinate 4 MG/0.5ML Solution Auto-injector, Inject 1 Syringe as instructed as needed., Disp: 3 mL, Rfl: 11  •  verapamil (ISOPTIN) 40 MG Tab, One tab PM and 2 tabs AM x 2 weeks; then 2 tabs AM and 2 tabs PM., Disp: 120 Tab, Rfl: 11  •  alprazolam (XANAX) 0.5 MG Tab, Take 1 Tab by mouth 2 times a day as needed for Anxiety (panic attacks)., Disp: 15 Tab, Rfl: 0  •  venlafaxine (EFFEXOR-XR) 150 MG extended-release capsule, TAKE ONE CAPSULE BY MOUTH ONCE DAILY, Disp: 90 Cap, Rfl: 0  •  quetiapine (SEROQUEL) 50 MG tablet, Take 3 Tabs by mouth every bedtime., Disp: 90 Tab, Rfl: 0  •  butalbital-acetaminophen-caffeine-codeine (FIORICET W/CODEINE) -79-30 MG per capsule, Take 1 Cap by mouth every four hours as needed for Headache., Disp: 20 Cap, Rfl: 3  •  butalbital-acetaminophen-caffeine-codeine (FIORICET W/CODEINE) -51-30 MG per capsule, Take 1 Cap by mouth every four hours as needed for Headache., Disp: 20 Cap, Rfl: 3  •  amitriptyline (ELAVIL) 50 MG Tab, 1 tab in evening for headache, Disp: 90 Tab, Rfl: 3  •  MethylPREDNISolone (MEDROL DOSEPAK) 4 MG Tablet Therapy Pack, Take as directed, Disp: 1 Kit, Rfl: 0  •  promethazine (PHENERGAN) 25 MG Tab, 25 mg phenergan +/- 25 mg  "benadryl +/- excedrin migraine.  Max one phenergan per day., Disp: 12 Tab, Rfl: 11  •  PredniSONE 5 MG (48) Tablet Therapy Pack, Take 1 Tab by mouth See Admin Instructions., Disp: 1 Each, Rfl: 3  •  methylPREDNISolone (MEDROL DOSEPACK) 4 MG tablet, Take as directed, Disp: 1 Kit, Rfl: 0  •  hydrocodone-acetaminophen (NORCO) 7.5-325 MG per tablet, Take 1 Tab by mouth every 6 hours as needed., Disp: 20 Tab, Rfl: 0  •  PredniSONE, Rosalio, 5 MG Tab, Take as directed for taper, Disp: 1 Tab, Rfl: 11    Review of Systems   HENT: Positive for ear pain (right one ). Negative for rhinorrhea.    Respiratory: Positive for cough. Negative for shortness of breath.    Neurological: Positive for headaches.          Objective:     /82 mmHg  Pulse 80  Temp(Src) 37.3 °C (99.2 °F)  Resp 12  Ht 1.651 m (5' 5\")  Wt 103.42 kg (228 lb)  BMI 37.94 kg/m2  SpO2 95%     Physical Exam   Constitutional: She appears well-developed and well-nourished. She is active and cooperative.  Non-toxic appearance. She has a sickly appearance. No distress.   HENT:   Head: Normocephalic and atraumatic.   Nose: Mucosal edema and rhinorrhea present. Right sinus exhibits no maxillary sinus tenderness and no frontal sinus tenderness. Left sinus exhibits no maxillary sinus tenderness and no frontal sinus tenderness.   Mouth/Throat: Uvula is midline and mucous membranes are normal. Posterior oropharyngeal erythema (very mild ) present.   Cerumen in canal bilaterally . Cerumen removed via irrigation by medical assistant. Her right tympanic membrane is dull and erythematous.   Eyes: Conjunctivae and EOM are normal.   Cardiovascular: Normal rate, regular rhythm and normal heart sounds.    Pulmonary/Chest: Effort normal and breath sounds normal. No tachypnea. No respiratory distress. She has no decreased breath sounds. She has no wheezes. She has no rhonchi. She has no rales.   Lymphadenopathy:     She has no cervical adenopathy.   Neurological: She is alert. " She is not disoriented. She displays no tremor. She displays no seizure activity. Coordination and gait normal.   Skin: Skin is warm and dry. She is not diaphoretic.   Psychiatric: She has a normal mood and affect. Her speech is normal and behavior is normal.     Cerumen removed via irrigation by MA           Assessment/Plan:     1. Non-productive cough  Discussed with patient. Prescription for Tessalon one capsule up to 3 times a day as needed for cough. Can continue over-the-counter symptomatic treatment  - benzonatate (TESSALON) 100 MG Cap; Take 1 Cap by mouth 3 times a day as needed for Cough.  Dispense: 60 Cap; Refill: 0    2. Otalgia, right ear  Will treat patient for an early otitis media. Augmentin 875 one tablet twice daily for 10 days.  - amoxicillin-clavulanate (AUGMENTIN) 875-125 MG Tab; Take 1 Tab by mouth 2 times a day.  Dispense: 20 Tab; Refill: 0    3. Bilateral impacted cerumen  Resolved after irrigation    The patient is to let me know if she does not feel any better after the above treatment. We discussed taking Augmentin with food and if she has any diarrhea after stopping the medication she is to let me know.

## 2017-05-08 NOTE — MR AVS SNAPSHOT
"        Christal Quezada Osbaldo   2017 11:00 AM   Office Visit   MRN: [id number]    Department:  [facility]   Dept Phone:  [phone]    Description:  Female : 1980   Provider:  Ragini De La O M.D.           Reason for Visit     Cough x 4 days    Nasal Congestion x 4 days      Allergies as of 2017     Allergen Noted Reactions    Nkda [No Known Drug Allergy] 2013         You were diagnosed with     Non-productive cough   [102334]       Otalgia, right ear   [2535397]       Bilateral impacted cerumen   [091548]         Vital Signs     Blood Pressure Pulse Temperature Respirations Height Weight    118/82 mmHg 80 37.3 °C (99.2 °F) 12 1.651 m (5' 5\") 103.42 kg (228 lb)    Body Mass Index Oxygen Saturation Smoking Status             37.94 kg/m2 95% Never Smoker          Basic Information     Date Of Birth Sex Race Ethnicity Preferred Language    1980 Female  or   Origin (Dutch,Slovenian,Burmese,Wallisian, etc) English      Your appointments     2017 11:40 AM   Follow Up Visit with Afua Roblero PA-C   [facility] (--)    [address], [address]  [facility] [address] [phone]           You will be receiving a confirmation call a few days before your appointment from our automated call confirmation system.              Problem List              ICD-10-CM Priority Class Noted - Resolved    Personal history of gout Z87.39   2011 - Present    Anxiety disorder F41.9   2013 - Present    Intractable migraine without aura and without status migrainosus G43.019   2015 - Present      R47.01   9/3/2015 - Present    Hypotension I95.9   2015 - Present    Migraine with aura and without status migrainosus, not intractable G43.109   2016 - Present    Low back pain M54.5   5/10/2016 - Present    Encounter for management and injection of injectable progestin contraceptive Z30.42   10/4/2016 - Present    Chronic migraine G43.709   " 12/19/2016 - Present      Health Maintenance        Date Due Completion Dates    IMM DTaP/Tdap/Td Vaccine (1 - Tdap) 8/1/2011 7/31/2011    PAP SMEAR 7/20/2018 7/20/2015, 6/24/2013 (Done)    [facility]            Current Immunizations     TD Vaccine 7/31/2011      Below and/or attached are the medications your provider expects you to take. Review all of your home medications and newly ordered medications with your provider and/or pharmacist. Follow medication instructions as directed by your provider and/or pharmacist. Please keep your medication list with you and share with your provider. Update the information when medications are discontinued, doses are changed, or new medications (including over-the-counter products) are added; and carry medication information at all times in the event of emergency situations     Allergies:  NKDA - (reactions not documented)               Medications  Valid as of: May 08, 2017 -  1:17 PM    Generic Name Brand Name Tablet Size Instructions for use    ALPRAZolam (Tab) XANAX 0.5 MG Take 1 Tab by mouth 2 times a day as needed for Anxiety (panic attacks).        Amitriptyline HCl (Tab) ELAVIL 50 MG 1 tab in evening for headache        Amoxicillin-Pot Clavulanate (Tab) AUGMENTIN 875-125 MG Take 1 Tab by mouth 2 times a day.        Benzonatate (Cap) TESSALON 100 MG Take 1 Cap by mouth 3 times a day as needed for Cough.        Butalbital-APAP-Caff-Cod (Cap) FIORICET W/CODEINE -51-30 MG Take 1 Cap by mouth every four hours as needed for Headache.        Butalbital-APAP-Caff-Cod (Cap) FIORICET W/CODEINE -54-30 MG Take 1 Cap by mouth every four hours as needed for Headache.        Hydrocodone-Acetaminophen (Tab) NORCO 7.5-325 MG Take 1 Tab by mouth every 6 hours as needed.        MethylPREDNISolone (Kit) MEDROL DOSEPACK 4 MG Take as directed        MethylPREDNISolone (Tablet Therapy Pack) MEDROL DOSEPAK 4 MG Take as directed         PredniSONE (Tab) PredniSONE (Rosalio) 5 MG Take as directed for taper        PredniSONE (Tablet Therapy Pack) PredniSONE 5 MG (48) Take 1 Tab by mouth See Admin Instructions.        Promethazine HCl (Tab) PHENERGAN 25 MG 25 mg phenergan +/- 25 mg benadryl +/- excedrin migraine.  Max one phenergan per day.        QUEtiapine Fumarate (Tab) SEROQUEL 50 MG Take 3 Tabs by mouth every bedtime.        QUEtiapine Fumarate (Tab) SEROQUEL 50 MG TAKE THREE TABLETS BY MOUTH ONCE DAILY AT BEDTIME        SUMAtriptan Succinate (Solution Auto-injector) SUMAtriptan Succinate 4 MG/0.5ML Inject 1 Syringe as instructed as needed.        Venlafaxine HCl (CAPSULE SR 24 HR) EFFEXOR- MG TAKE ONE CAPSULE BY MOUTH ONCE DAILY        Verapamil HCl (Tab) ISOPTIN 40 MG One tab PM and 2 tabs AM x 2 weeks; then 2 tabs AM and 2 tabs PM.        .                 Medicines prescribed today were sent to:     [facility] [address] [place] [address]    Phone: [phone] Fax: [phone]    Open 24 Hours?: No    [facility] [place] [address]    [address] [address]    Phone: [phone] Fax: [phone]    Open 24 Hours?: No      Medication refill instructions:       If your prescription bottle indicates you have medication refills left, it is not necessary to call your provider’s office. Please contact your pharmacy and they will refill your medication.    If your prescription bottle indicates you do not have any refills left, you may request refills at any time through one of the following ways: The online [employer] system (except Urgent Care), by calling your provider’s office, or by asking your pharmacy to contact your provider’s office with a refill request. Medication refills are processed only during regular business hours and may not be available until the next business day. Your provider may request additional information or to have a  follow-up visit with you prior to refilling your medication.   *Please Note: Medication refills are assigned a new Rx number when refilled electronically. Your pharmacy may indicate that no refills were authorized even though a new prescription for the same medication is available at the pharmacy. Please request the medicine by name with the pharmacy before contacting your provider for a refill.           [employer] Access Code: Activation code not generated  Current [employer] Status: Active

## 2017-06-27 ENCOUNTER — NON-PROVIDER VISIT (OUTPATIENT)
Dept: MEDICAL GROUP | Facility: LAB | Age: 37
End: 2017-06-27
Payer: COMMERCIAL

## 2017-06-27 DIAGNOSIS — Z30.42 ENCOUNTER FOR MANAGEMENT AND INJECTION OF INJECTABLE PROGESTIN CONTRACEPTIVE: ICD-10-CM

## 2017-06-27 PROCEDURE — 99999 PR NO CHARGE: CPT | Performed by: NURSE PRACTITIONER

## 2017-06-27 PROCEDURE — 96372 THER/PROPH/DIAG INJ SC/IM: CPT | Performed by: NURSE PRACTITIONER

## 2017-06-27 RX ORDER — MEDROXYPROGESTERONE ACETATE 150 MG/ML
150 INJECTION, SUSPENSION INTRAMUSCULAR ONCE
Status: COMPLETED | OUTPATIENT
Start: 2017-06-27 | End: 2017-06-27

## 2017-06-27 RX ADMIN — MEDROXYPROGESTERONE ACETATE 150 MG: 150 INJECTION, SUSPENSION INTRAMUSCULAR at 15:00

## 2017-06-27 NOTE — MR AVS SNAPSHOT
Christal Roblero   2017 2:55 PM   Appointment   MRN: [id number]    Department:  [facility]   Dept Phone:  [phone]    Description:  Female : 1980   Provider:  JUICE PATEL MA           Allergies as of 2017     Allergen Noted Reactions    Nkda [No Known Drug Allergy] 2013         Vital Signs     Smoking Status                   Never Smoker            Basic Information     Date Of Birth Sex Race Ethnicity Preferred Language    1980 Female  or   Origin (Danish,Samoan,Turkmen,Rei, etc) English      Your appointments     2017  2:55 PM   Non Provider 1 with JUICE PATEL MA   [facility] (--)    [address]  [place] [address] [phone]           You will be receiving a confirmation call a few days before your appointment from our automated call confirmation system.            2017 11:40 AM   Follow Up Visit with Afua Roblero PA-C   [facility] (--)    [address], [address]  [place] [address] [phone]           You will be receiving a confirmation call a few days before your appointment from our automated call confirmation system.              Problem List              ICD-10-CM Priority Class Noted - Resolved    Personal history of gout Z87.39   2011 - Present    Anxiety disorder F41.9   2013 - Present    Intractable migraine without aura and without status migrainosus G43.019   2015 - Present      R47.01   9/3/2015 - Present    Hypotension I95.9   2015 - Present    Migraine with aura and without status migrainosus, not intractable G43.109   2016 - Present    Low back pain M54.5   5/10/2016 - Present    Encounter for management and injection of injectable progestin contraceptive Z30.42   10/4/2016 - Present    Chronic migraine G43.709   2016 - Present      Health Maintenance        Date Due Completion Dates    IMM  DTaP/Tdap/Td Vaccine (1 - Tdap) 8/1/2011 7/31/2011    PAP SMEAR 7/20/2018 7/20/2015, 6/24/2013 (Done)    [facility]            Current Immunizations     TD Vaccine 7/31/2011      Below and/or attached are the medications your provider expects you to take. Review all of your home medications and newly ordered medications with your provider and/or pharmacist. Follow medication instructions as directed by your provider and/or pharmacist. Please keep your medication list with you and share with your provider. Update the information when medications are discontinued, doses are changed, or new medications (including over-the-counter products) are added; and carry medication information at all times in the event of emergency situations     Allergies:  NKDA - (reactions not documented)               Medications  Valid as of: June 27, 2017 -  2:50 PM    Generic Name Brand Name Tablet Size Instructions for use    ALPRAZolam (Tab) XANAX 0.5 MG Take 1 Tab by mouth 2 times a day as needed for Anxiety (panic attacks).        Amitriptyline HCl (Tab) ELAVIL 50 MG 1 tab in evening for headache        Amoxicillin-Pot Clavulanate (Tab) AUGMENTIN 875-125 MG Take 1 Tab by mouth 2 times a day.        Benzonatate (Cap) TESSALON 100 MG Take 1 Cap by mouth 3 times a day as needed for Cough.        Butalbital-APAP-Caff-Cod (Cap) FIORICET W/CODEINE -69-30 MG Take 1 Cap by mouth every four hours as needed for Headache.        Butalbital-APAP-Caff-Cod (Cap) FIORICET W/CODEINE -72-30 MG Take 1 Cap by mouth every four hours as needed for Headache.        Hydrocodone-Acetaminophen (Tab) NORCO 7.5-325 MG Take 1 Tab by mouth every 6 hours as needed.        MethylPREDNISolone (Kit) MEDROL DOSEPACK 4 MG Take as directed        MethylPREDNISolone (Tablet Therapy Pack) MEDROL DOSEPAK 4 MG Take as directed        PredniSONE (Tab) PredniSONE (Rosalio) 5 MG Take as directed for taper        PredniSONE (Tablet  Therapy Pack) PredniSONE 5 MG (48) Take 1 Tab by mouth See Admin Instructions.        Promethazine HCl (Tab) PHENERGAN 25 MG 25 mg phenergan +/- 25 mg benadryl +/- excedrin migraine.  Max one phenergan per day.        QUEtiapine Fumarate (Tab) SEROQUEL 50 MG Take 3 Tabs by mouth every bedtime.        QUEtiapine Fumarate (Tab) SEROQUEL 50 MG TAKE THREE TABLETS BY MOUTH ONCE DAILY AT BEDTIME        SUMAtriptan Succinate (Solution Auto-injector) SUMAtriptan Succinate 4 MG/0.5ML Inject 1 Syringe as instructed as needed.        Venlafaxine HCl (CAPSULE SR 24 HR) EFFEXOR- MG TAKE ONE CAPSULE BY MOUTH ONCE DAILY        Verapamil HCl (Tab) ISOPTIN 40 MG One tab PM and 2 tabs AM x 2 weeks; then 2 tabs AM and 2 tabs PM.        .                 Medicines prescribed today were sent to:     [facility] [address] [place] [address] [address] [address]    Phone: [phone] Fax: [phone]    Open 24 Hours?: No    [facility] [address] [place] [address]    [address] [address]    Phone: [phone] Fax: [phone]    Open 24 Hours?: No      Medication refill instructions:       If your prescription bottle indicates you have medication refills left, it is not necessary to call your provider’s office. Please contact your pharmacy and they will refill your medication.    If your prescription bottle indicates you do not have any refills left, you may request refills at any time through one of the following ways: The online [employer] system (except Urgent Care), by calling your provider’s office, or by asking your pharmacy to contact your provider’s office with a refill request. Medication refills are processed only during regular business hours and may not be available until the next business day. Your provider may request additional information or to have a follow-up visit with you prior to refilling your medication.   *Please Note: Medication refills  are assigned a new Rx number when refilled electronically. Your pharmacy may indicate that no refills were authorized even though a new prescription for the same medication is available at the pharmacy. Please request the medicine by name with the pharmacy before contacting your provider for a refill.           [employer] Access Code: Activation code not generated  Current [employer] Status: Active

## 2017-06-28 NOTE — PROGRESS NOTES
I have placed the below orders and discussed them with Dr. De La O. the MA is performing the below orders under the direction of Dr. De La O

## 2017-06-28 NOTE — NON-PROVIDER
Christal Roblero is a 37 y.o. here for a Depo Provera Injection.     Date of last Depo Provera Injection: 3/31/17  Current date within therapeutic range?: Yes   Urine pregnancy test done (needed if out of date range): not performed  Date of office visit:5/8/17  Date of last pap (if > 21 years old)/ GYN exam: 7/20/15  Dx: Contraceptive use    Order and dose verified by: TJF  Patient tolerated injection and no adverse effects were observed or reported: Yes    # of Administrations remaining in MAR: 0  Next injection due between 9/12/17 and 9/26/17.

## 2017-07-03 RX ORDER — VENLAFAXINE HYDROCHLORIDE 150 MG/1
CAPSULE, EXTENDED RELEASE ORAL
Qty: 90 CAP | Refills: 0 | Status: SHIPPED | OUTPATIENT
Start: 2017-07-03 | End: 2017-10-06 | Stop reason: SDUPTHER

## 2017-07-03 NOTE — TELEPHONE ENCOUNTER
Was the patient seen in the last year in this department? Yes     Does patient have an active prescription for medications requested? No     Received Request Via: Patient     Last visit:5/8/17

## 2017-07-05 ENCOUNTER — OFFICE VISIT (OUTPATIENT)
Dept: MEDICAL GROUP | Facility: LAB | Age: 37
End: 2017-07-05
Payer: COMMERCIAL

## 2017-07-05 VITALS
HEIGHT: 65 IN | SYSTOLIC BLOOD PRESSURE: 132 MMHG | RESPIRATION RATE: 12 BRPM | OXYGEN SATURATION: 95 % | BODY MASS INDEX: 37.72 KG/M2 | HEART RATE: 93 BPM | TEMPERATURE: 97.7 F | DIASTOLIC BLOOD PRESSURE: 80 MMHG | WEIGHT: 226.41 LBS

## 2017-07-05 DIAGNOSIS — G43.019 INTRACTABLE MIGRAINE WITHOUT AURA AND WITHOUT STATUS MIGRAINOSUS: ICD-10-CM

## 2017-07-05 DIAGNOSIS — E66.9 OBESITY (BMI 35.0-39.9 WITHOUT COMORBIDITY): ICD-10-CM

## 2017-07-05 DIAGNOSIS — G47.00 INSOMNIA, UNSPECIFIED TYPE: ICD-10-CM

## 2017-07-05 DIAGNOSIS — M79.672 PAIN OF LEFT HEEL: ICD-10-CM

## 2017-07-05 PROCEDURE — 99214 OFFICE O/P EST MOD 30 MIN: CPT | Performed by: NURSE PRACTITIONER

## 2017-07-05 RX ORDER — ZALEPLON 5 MG/1
5 CAPSULE ORAL
Qty: 20 CAP | Refills: 0 | Status: SHIPPED
Start: 2017-07-05 | End: 2017-08-02 | Stop reason: SDUPTHER

## 2017-07-05 ASSESSMENT — PATIENT HEALTH QUESTIONNAIRE - PHQ9: CLINICAL INTERPRETATION OF PHQ2 SCORE: 0

## 2017-07-05 NOTE — MR AVS SNAPSHOT
"        Christal Quezada Osbaldo   2017 1:20 PM   Office Visit   MRN: [id number]    Department:  [facility]   Dept Phone:  [phone]    Description:  Female : 1980   Provider:  MARIA EUGENIA Gauthier           Reason for Visit     Foot Pain Heel Pain while walking/sitting x3 Months, Insomnia, Concerned about Weight.       Allergies as of 2017     Allergen Noted Reactions    [place]urel (Diagnostic) 2017   Itching    Throat Itchiness    Nkda [No Known Drug Allergy] 2013         You were diagnosed with     Pain of left heel   [291118]       Intractable migraine without aura and without status migrainosus   [527097]       Insomnia, unspecified type   [3281171]       Obesity (BMI 35.0-39.9 without comorbidity) ([facility])   [631218]         Vital Signs     Blood Pressure Pulse Temperature Respirations Height Weight    132/80 mmHg 93 36.5 °C (97.7 °F) 12 1.66 m (5' 5.35\") 102.7 kg (226 lb 6.6 oz)    Body Mass Index Oxygen Saturation Smoking Status             37.27 kg/m2 95% Never Smoker          Basic Information     Date Of Birth Sex Race Ethnicity Preferred Language    1980 Female  or   Origin (Guamanian,Czech,South African,Rei, etc) English      Your appointments     2017 11:40 AM   Follow Up Visit with Afua Roblero PA-C   [facility] (--)    [address], [address]  [place] [address] [phone]           You will be receiving a confirmation call a few days before your appointment from our automated call confirmation system.            Aug 09, 2017  2:00 PM   Established Patient with MARIA EUGENIA Gauthier   [facility] (--)    [address]  [place] [address] [phone]           You will be receiving a confirmation call a few days before your appointment from our automated call confirmation system.              Problem List              ICD-10-CM Priority Class Noted - Resolved  "    Personal history of gout Z87.39   5/17/2011 - Present    Anxiety disorder F41.9   2/5/2013 - Present    Intractable migraine without aura and without status migrainosus G43.019   8/25/2015 - Present      R47.01   9/3/2015 - Present    Hypotension I95.9   9/7/2015 - Present    Migraine with aura and without status migrainosus, not intractable G43.109   4/22/2016 - Present    Low back pain M54.5   5/10/2016 - Present    Encounter for management and injection of injectable progestin contraceptive Z30.42   10/4/2016 - Present    Chronic migraine G43.709   12/19/2016 - Present    Obesity (BMI 35.0-39.9 without comorbidity) (HCC) E66.9   7/5/2017 - Present      Health Maintenance        Date Due Completion Dates    IMM DTaP/Tdap/Td Vaccine (1 - Tdap) 8/1/2011 7/31/2011    IMM INFLUENZA (1) 9/1/2017 ---    PAP SMEAR 7/20/2018 7/20/2015, 6/24/2013 (Done)    [facility]            Current Immunizations     TD Vaccine 7/31/2011      Below and/or attached are the medications your provider expects you to take. Review all of your home medications and newly ordered medications with your provider and/or pharmacist. Follow medication instructions as directed by your provider and/or pharmacist. Please keep your medication list with you and share with your provider. Update the information when medications are discontinued, doses are changed, or new medications (including over-the-counter products) are added; and carry medication information at all times in the event of emergency situations     Allergies:  ALMOND (DIAGNOSTIC) - Itching     NKDA - (reactions not documented)               Medications  Valid as of: July 05, 2017 -  1:59 PM    Generic Name Brand Name Tablet Size Instructions for use    ALPRAZolam (Tab) XANAX 0.5 MG Take 1 Tab by mouth 2 times a day as needed for Anxiety (panic attacks).        Amitriptyline HCl (Tab) ELAVIL 50 MG 1 tab in evening for headache         Butalbital-APAP-Caff-Cod (Cap) FIORICET W/CODEINE -58-30 MG Take 1 Cap by mouth every four hours as needed for Headache.        Hydrocodone-Acetaminophen (Tab) NORCO 7.5-325 MG Take 1 Tab by mouth every 6 hours as needed.        Phentermine HCl (TABLET DISPERSIBLE) Phentermine HCl 15 MG Take 1 Tab by mouth 1 time daily as needed. For weight loss.  Take in the morning.        QUEtiapine Fumarate (Tab) SEROQUEL 50 MG Take 3 Tabs by mouth every bedtime.        SUMAtriptan Succinate (Solution Auto-injector) SUMAtriptan Succinate 4 MG/0.5ML Inject 1 Syringe as instructed as needed.        Venlafaxine HCl (CAPSULE SR 24 HR) EFFEXOR- MG TAKE ONE CAPSULE BY MOUTH ONCE DAILY        Verapamil HCl (Tab) ISOPTIN 40 MG One tab PM and 2 tabs AM x 2 weeks; then 2 tabs AM and 2 tabs PM.        Zaleplon (Cap) SONATA 5 MG Take 1 Cap by mouth every bedtime.        .                 Medicines prescribed today were sent to:     [facility] [address] [place], [place] [address] [address] [address]    Phone: [phone] Fax: [phone]    Open 24 Hours?: No    [facility] - [place], [place] - [address] [place] [address]    [address] [address]    Phone: [phone] Fax: [phone]    Open 24 Hours?: No      Medication refill instructions:       If your prescription bottle indicates you have medication refills left, it is not necessary to call your provider’s office. Please contact your pharmacy and they will refill your medication.    If your prescription bottle indicates you do not have any refills left, you may request refills at any time through one of the following ways: The online [employer] system (except Urgent Care), by calling your provider’s office, or by asking your pharmacy to contact your provider’s office with a refill request. Medication refills are processed only during regular business hours and may not be available until the next business day. Your provider may  request additional information or to have a follow-up visit with you prior to refilling your medication.   *Please Note: Medication refills are assigned a new Rx number when refilled electronically. Your pharmacy may indicate that no refills were authorized even though a new prescription for the same medication is available at the pharmacy. Please request the medicine by name with the pharmacy before contacting your provider for a refill.        Referral     A referral request has been sent to our patient care coordination department. Please allow 3-5 business days for us to process this request and contact you either by phone or mail. If you do not hear from us by the 5th business day, please call us at [phone].           [employer] Access Code: Activation code not generated  Current Shareable Social Status: Active

## 2017-07-05 NOTE — PROGRESS NOTES
"Chief Complaint   Patient presents with   • Foot Pain     Heel Pain while walking/sitting x3 Months, Insomnia, Concerned about Weight.        SHU Oakes is a 37-year-old established female here with a couple of issues:  #1-C/o new left heel pain x the past few months.  Pain is worse in the morning and when she first starts walking after a brief time resting.  Pain is described as a stabbing and aching sensation. No improvement with nsaids or ice.  She also tried to put inserts into her shoes and is currently using gel heel pads without improvement. She has tried stretches also without improvement and appearing frustrated. Not currently exercising but tried walking the zak (pain there prior) and pain worsened. Denies any injuries or trauma. Had a similar pain back in October and x-ray was negative for a heel spur.  #2-obesity: She is very frustrated with her weight. She states that she does not eat very much and struggles with weight loss. She is not currently exercising because of her foot. She is interested in discussing her diet and starting medication that may help with her weight. She is currently receiving Depo-Provera injections for contraception and is prescribed Seroquel as well as amitriptyline for sleep and headaches.  #3-insomnia: She would like to come off of Seroquel because of potential weight gain properties and she is unable to take this medication if she does not get home from work in time as it makes her very groggy.     Past medical, surgical, family, and social history is reviewed and updated in Epic chart by me today.   Medications and allergies reviewed and updated in Epic chart by me today.     ROS:   As documented in history of present illness above    Exam:  Blood pressure 132/80, pulse 93, temperature 36.5 °C (97.7 °F), resp. rate 12, height 1.7 m (5' 6.93\"), weight 102.7 kg (226 lb 6.6 oz), SpO2 95 %.  Constitutional: Alert, no distress, plus 3 vital signs  Skin:  Warm, dry, no rashes " invisible areas  Eye: Equal, round and reactive, conjunctiva clear  ENMT: Lips without lesions, good dentition, oropharynx clear    Neck: Trachea midline, no masses, no thyromegaly  Respiratory: Unlabored respiration, lungs clear to auscultation, no wheezes, no rhonchi  Cardiovascular: Normal rate and rhythm, no murmur, no edema  Musculoskeletal: She has point tenderness to the distal aspect of her left heel without any overlying bruising, swelling or abnormalities. She does not have point tenderness to most of her plantar fashion with the exception of the origin.  Psych: Alert, pleasant, well-groomed, normal affect    A/P:  1. Pain of left heel  -As she has already tried conservative measures with ice, NSAIDs and inserts, recommend that she have a consult with orthopedist. Discussed avoiding eating barefoot. She was given if he stretches. We discussed splints.  - REFERRAL TO ORTHOPEDICS    2. Intractable migraine without aura and without status migrainosus  -Currently followed by neurology.    3. Insomnia, unspecified type  -She was given a written plan to taper off of Seroquel and when she is down to half a pill every other night she will start Sonata, emailing me with the response within a few days of starting Sonata. She understands not to drink alcohol when taking Sonata.  - zaleplon (SONATA) 5 MG capsule; Take 1 Cap by mouth every bedtime.  Dispense: 20 Cap; Refill: 0    4. Obesity (BMI 35.0-39.9 without comorbidity) ([facility])  -Trial of phentermine 15 mg every morning before breakfast. Discussed eliminating white carbohydrates and focusing on a high vegetable based diet. Discussed exercising on a stationary bicycle and doing light, repetitively lifting. Follow-up in one month.  - Phentermine HCl 15 MG TABLET DISPERSIBLE; Take 1 Tab by mouth 1 time daily as needed. For weight loss.  Take in the morning.  Dispense: 15 Tab; Refill: 0  - Patient identified as having weight management issue.  Appropriate orders and  counseling given.    Side effects of all medications prescribed today were discussed with the patient including how to take the medications and proper dosage. Discussed repercussions of not taking the medications as prescribed. Instructed to call the office should she have any negative side effects or problems with the medications.

## 2017-07-18 ENCOUNTER — APPOINTMENT (OUTPATIENT)
Dept: NEUROLOGY | Facility: MEDICAL CENTER | Age: 37
End: 2017-07-18
Payer: COMMERCIAL

## 2017-07-20 DIAGNOSIS — F41.1 GENERALIZED ANXIETY DISORDER: ICD-10-CM

## 2017-07-20 DIAGNOSIS — E66.9 OBESITY (BMI 35.0-39.9 WITHOUT COMORBIDITY): ICD-10-CM

## 2017-07-20 RX ORDER — ALPRAZOLAM 0.5 MG/1
0.5 TABLET ORAL 2 TIMES DAILY PRN
Qty: 15 TAB | Refills: 0 | Status: SHIPPED
Start: 2017-07-20 | End: 2018-03-08

## 2017-07-20 NOTE — TELEPHONE ENCOUNTER
Was the patient seen in the last year in this department? Yes     Does patient have an active prescription for medications requested? No     Received Request Via: Patient     Last visit:7/5/17    Last  matthew:xanax- 2/21/17          Phentermine-7/5/17

## 2017-08-02 DIAGNOSIS — G47.00 INSOMNIA, UNSPECIFIED TYPE: ICD-10-CM

## 2017-08-02 RX ORDER — ZALEPLON 5 MG/1
5 CAPSULE ORAL
Qty: 20 CAP | Refills: 0 | Status: SHIPPED
Start: 2017-08-02 | End: 2017-08-09

## 2017-08-02 NOTE — TELEPHONE ENCOUNTER
Was the patient seen in the last year in this department? Yes /  Last Fill 7/5/17 #20    Does patient have an active prescription for medications requested? No     Received Request Via: Patient

## 2017-08-09 ENCOUNTER — OFFICE VISIT (OUTPATIENT)
Dept: MEDICAL GROUP | Facility: LAB | Age: 37
End: 2017-08-09
Payer: COMMERCIAL

## 2017-08-09 VITALS
RESPIRATION RATE: 12 BRPM | TEMPERATURE: 98.8 F | SYSTOLIC BLOOD PRESSURE: 118 MMHG | DIASTOLIC BLOOD PRESSURE: 86 MMHG | OXYGEN SATURATION: 97 % | HEART RATE: 85 BPM | WEIGHT: 219 LBS | HEIGHT: 65 IN | BODY MASS INDEX: 36.49 KG/M2

## 2017-08-09 DIAGNOSIS — F51.01 PRIMARY INSOMNIA: ICD-10-CM

## 2017-08-09 DIAGNOSIS — G89.29 CHRONIC MIDLINE LOW BACK PAIN WITHOUT SCIATICA: ICD-10-CM

## 2017-08-09 DIAGNOSIS — M54.50 CHRONIC MIDLINE LOW BACK PAIN WITHOUT SCIATICA: ICD-10-CM

## 2017-08-09 DIAGNOSIS — E66.9 OBESITY (BMI 35.0-39.9 WITHOUT COMORBIDITY): ICD-10-CM

## 2017-08-09 PROCEDURE — 99214 OFFICE O/P EST MOD 30 MIN: CPT | Performed by: NURSE PRACTITIONER

## 2017-08-09 RX ORDER — ZOLPIDEM TARTRATE 10 MG/1
10 TABLET ORAL NIGHTLY PRN
Qty: 15 TAB | Refills: 0 | Status: SHIPPED
Start: 2017-08-09 | End: 2018-03-08

## 2017-08-09 NOTE — MR AVS SNAPSHOT
"        Christal Roblero   2017 2:00 PM   Office Visit   MRN: [id number]    Department:  [facility]   Dept Phone:  [phone]    Description:  Female : 1980   Provider:  MARIA EUGENIA Gauthier           Allergies as of 2017     Allergen Noted Reactions    [place] (Diagnostic) 2017   Itching    Throat Itchiness    Nkda [No Known Drug Allergy] 2013         You were diagnosed with     Primary insomnia   [541284]       Chronic midline low back pain without sciatica   [2864923]       Obesity (BMI 35.0-39.9 without comorbidity) ([facility])   [882267]         Vital Signs     Blood Pressure Pulse Temperature Respirations Height Weight    118/86 mmHg 85 37.1 °C (98.8 °F) 12 1.66 m (5' 5.35\") 99.338 kg (219 lb)    Body Mass Index Oxygen Saturation Smoking Status             36.05 kg/m2 97% Never Smoker          Basic Information     Date Of Birth Sex Race Ethnicity Preferred Language    1980 Female  or   Origin (Albanian,Qatari,Bahamian,Rei, etc) English      Problem List              ICD-10-CM Priority Class Noted - Resolved    Personal history of gout Z87.39   2011 - Present    Anxiety disorder F41.9   2013 - Present    Intractable migraine without aura and without status migrainosus G43.019   2015 - Present      R47.01   9/3/2015 - Present    Hypotension I95.9   2015 - Present    Migraine with aura and without status migrainosus, not intractable G43.109   2016 - Present    Low back pain M54.5   5/10/2016 - Present    Encounter for management and injection of injectable progestin contraceptive Z30.42   10/4/2016 - Present    Chronic migraine G43.709   2016 - Present    Obesity (BMI 35.0-39.9 without comorbidity) ([facility]) E66.9   2017 - Present    Primary insomnia F51.01   2017 - Present      Health Maintenance        Date Due Completion Dates    IMM DTaP/Tdap/Td Vaccine (1 - Tdap) 2011    IMM INFLUENZA (1) " 9/1/2017 ---    PAP SMEAR 7/20/2018 7/20/2015, 6/24/2013 (Done)    [facility]            Current Immunizations     TD Vaccine 7/31/2011      Below and/or attached are the medications your provider expects you to take. Review all of your home medications and newly ordered medications with your provider and/or pharmacist. Follow medication instructions as directed by your provider and/or pharmacist. Please keep your medication list with you and share with your provider. Update the information when medications are discontinued, doses are changed, or new medications (including over-the-counter products) are added; and carry medication information at all times in the event of emergency situations     Allergies:  ALMOND (DIAGNOSTIC) - Itching     NKDA - (reactions not documented)               Medications  Valid as of: August 09, 2017 -  2:31 PM    Generic Name Brand Name Tablet Size Instructions for use    ALPRAZolam (Tab) XANAX 0.5 MG Take 1 Tab by mouth 2 times a day as needed for Anxiety (panic attacks).        Amitriptyline HCl (Tab) ELAVIL 50 MG 1 tab in evening for headache        Butalbital-APAP-Caff-Cod (Cap) FIORICET W/CODEINE -98-30 MG Take 1 Cap by mouth every four hours as needed for Headache.        Hydrocodone-Acetaminophen (Tab) NORCO 7.5-325 MG Take 1 Tab by mouth every 6 hours as needed.        Phentermine HCl (TABLET DISPERSIBLE) Phentermine HCl 15 MG Take 1 Tab by mouth 1 time daily as needed. For weight loss.  Take in the morning.        SUMAtriptan Succinate (Solution Auto-injector) SUMAtriptan Succinate 4 MG/0.5ML Inject 1 Syringe as instructed as needed.        Venlafaxine HCl (CAPSULE SR 24 HR) EFFEXOR- MG TAKE ONE CAPSULE BY MOUTH ONCE DAILY        Verapamil HCl (Tab) ISOPTIN 40 MG One tab PM and 2 tabs AM x 2 weeks; then 2 tabs AM and 2 tabs PM.        Zolpidem Tartrate (Tab) AMBIEN 10 MG Take 1 Tab by mouth at bedtime as needed for Sleep.         .                 Medicines prescribed today were sent to:     [facility] - [place], [place] - [address]    [address] [facility] [place] [address]    Phone: [phone] Fax: [phone]    Open 24 Hours?: No    [facility] - [place], [place] - [address] [facility] [address]    [address] [facility] [address]    Phone: [phone] Fax: [phone]    Open 24 Hours?: No      Medication refill instructions:       If your prescription bottle indicates you have medication refills left, it is not necessary to call your provider’s office. Please contact your pharmacy and they will refill your medication.    If your prescription bottle indicates you do not have any refills left, you may request refills at any time through one of the following ways: The online [employer] system (except Urgent Care), by calling your provider’s office, or by asking your pharmacy to contact your provider’s office with a refill request. Medication refills are processed only during regular business hours and may not be available until the next business day. Your provider may request additional information or to have a follow-up visit with you prior to refilling your medication.   *Please Note: Medication refills are assigned a new Rx number when refilled electronically. Your pharmacy may indicate that no refills were authorized even though a new prescription for the same medication is available at the pharmacy. Please request the medicine by name with the pharmacy before contacting your provider for a refill.           [employer] Access Code: Activation code not generated  Current [employer] Status: Active

## 2017-08-09 NOTE — PROGRESS NOTES
"CC  F/u on chronic issues.      HPI  Christal is a 36 yo est female here to f/u on a few issues:     #1- heel pain: Seeing Ortho about heel 7/24/2017.  Using heel insert and this does help a little bit.  Her pain medication, also prescribed for her chronic back pain also helps with her heel.    #2-migraines: Continues imitrex injections for migraines. Migraines have been a bit worse lately but nothing new or different terms of characteristics.    #3-Obesity:  phenetermine helped initially but then stopped helping as much as she stopped sleeping well.  No negative side effects such as heart palpitations, chest pain or increased anxiety.    #4-insomnia: Unfortunately Sonata was not helpful at all and she continued having difficulty falling asleep and staying asleep. She is currently sleeping about 12 hours that night. She works swing shift, typically 3 AM to 3 PM. She would like to try some meals for sleep. She has not done well in the past with amitriptyline and Seroquel caused her to gain weight.    Past medical, surgical, family, and social history is reviewed and updated in Epic chart by me today.   Medications and allergies reviewed and updated in Epic chart by me today.     ROS:   As documented in history of present illness above    Exam:  Blood pressure 118/86, pulse 85, temperature 37.1 °C (98.8 °F), resp. rate 12, height 1.66 m (5' 5.35\"), weight 99.338 kg (219 lb), SpO2 97 %.  Gen. appears healthy in no distress   Skin appropriate for sex and age   HEENT unremarkable   Neck no adenopathy, no nodules or masses palpable   Lungs clear   Heart regular   Extremities no edema   Neuro gait and station normal   Psych appropriate      A/P:  1. Primary insomnia  -Trial of Ambien. Encouraged her to allow at least 6 hours for sleep when taking Ambien. Encouraged her not to take Ambien with Xanax or hydrocodone. She reports that she very rarely takes Xanax and always avoids taking it within 4 hours of a narcotic. She does " not drink. If Ambien works but does not keep her asleep long enough, we discussed Lunesta.  - zolpidem (AMBIEN) 10 MG Tab; Take 1 Tab by mouth at bedtime as needed for Sleep.  Dispense: 15 Tab; Refill: 0    2. Chronic midline low back pain without sciatica  -Stable and currently followed by pain management    3. Obesity (BMI 35.0-39.9 without comorbidity) ([facility])  -Improved, she has lost 7 pounds. Encouraged her to try phentermine for 3 months and then come off of it. Discussed portion control and daily physical exercise. Reviewed side effects of phentermine encouraged her to stop it for onset of heart palpitations, increased anxiety or worsening insomnia.  - Phentermine HCl 15 MG TABLET DISPERSIBLE; Take 1 Tab by mouth 1 time daily as needed. For weight loss.  Take in the morning.  Dispense: 30 Tab; Refill: 2

## 2017-08-15 DIAGNOSIS — F51.01 PRIMARY INSOMNIA: ICD-10-CM

## 2017-08-22 DIAGNOSIS — F51.01 PRIMARY INSOMNIA: ICD-10-CM

## 2017-08-22 RX ORDER — DOXEPIN HYDROCHLORIDE 10 MG/1
10 CAPSULE ORAL EVERY EVENING
Qty: 10 CAP | Refills: 0 | Status: SHIPPED | OUTPATIENT
Start: 2017-08-22 | End: 2018-03-08

## 2017-08-23 ENCOUNTER — OFFICE VISIT (OUTPATIENT)
Dept: NEUROLOGY | Facility: MEDICAL CENTER | Age: 37
End: 2017-08-23
Payer: COMMERCIAL

## 2017-08-23 VITALS
TEMPERATURE: 98.6 F | BODY MASS INDEX: 36.71 KG/M2 | DIASTOLIC BLOOD PRESSURE: 70 MMHG | SYSTOLIC BLOOD PRESSURE: 120 MMHG | OXYGEN SATURATION: 95 % | WEIGHT: 220.3 LBS | HEART RATE: 82 BPM | HEIGHT: 65 IN

## 2017-08-23 DIAGNOSIS — G43.109 MIGRAINE WITH AURA AND WITHOUT STATUS MIGRAINOSUS, NOT INTRACTABLE: ICD-10-CM

## 2017-08-23 PROCEDURE — 99214 OFFICE O/P EST MOD 30 MIN: CPT | Performed by: PHYSICIAN ASSISTANT

## 2017-08-23 RX ORDER — PROMETHAZINE HYDROCHLORIDE 25 MG/1
25 TABLET ORAL
Qty: 30 TAB | Refills: 11 | Status: SHIPPED | OUTPATIENT
Start: 2017-08-23 | End: 2018-03-08

## 2017-08-23 RX ORDER — ZALEPLON 5 MG/1
CAPSULE ORAL
COMMUNITY
Start: 2017-08-03 | End: 2018-03-08

## 2017-08-23 RX ORDER — PROMETHAZINE HYDROCHLORIDE 25 MG/1
TABLET ORAL
COMMUNITY
Start: 2017-08-21 | End: 2017-08-23 | Stop reason: SDUPTHER

## 2017-08-23 RX ORDER — BACLOFEN 10 MG/1
TABLET ORAL
COMMUNITY
Start: 2017-08-14 | End: 2019-04-10

## 2017-08-23 RX ORDER — VERAPAMIL HYDROCHLORIDE 120 MG/1
120 CAPSULE, EXTENDED RELEASE ORAL DAILY
Qty: 30 CAP | Refills: 3 | Status: SHIPPED | OUTPATIENT
Start: 2017-08-23 | End: 2017-11-29 | Stop reason: SDUPTHER

## 2017-08-23 RX ORDER — LIDOCAINE 50 MG/G
PATCH TOPICAL
COMMUNITY
Start: 2017-07-19 | End: 2019-06-18

## 2017-08-23 RX ORDER — GABAPENTIN 300 MG/1
CAPSULE ORAL
COMMUNITY
Start: 2017-08-14 | End: 2019-04-10

## 2017-08-23 NOTE — PATIENT INSTRUCTIONS
Valerian - daily pill to take to reduce insomnia  Melatonin - 5 mg   Phenergan - 25 mg tablet to knock yourself out    Pursue medical marijuana card if your employer OKays

## 2017-08-23 NOTE — MR AVS SNAPSHOT
"        Christal Damien Osbaldo   2017 2:20 PM   Office Visit   MRN: [id number]    Department:  [facility]   Dept Phone:  [phone]    Description:  Female : 1980   Provider:  Afua Roblero PA-C           Reason for Visit     Follow-Up migraine      Allergies as of 2017     Allergen Noted Reactions    [place] (Diagnostic) 2017   Itching    Throat Itchiness    Nkda [No Known Drug Allergy] 2013         You were diagnosed with     Migraine with aura and without status migrainosus, not intractable   [896451]       Chronic migraine   [639962]         Vital Signs     Blood Pressure Pulse Temperature Height Weight Body Mass Index    120/70 mmHg 82 37 °C (98.6 °F) 1.66 m (5' 5.35\") 99.927 kg (220 lb 4.8 oz) 36.26 kg/m2    Oxygen Saturation Smoking Status                95% Never Smoker           Basic Information     Date Of Birth Sex Race Ethnicity Preferred Language    1980 Female  or   Origin (Thai,Algerian,Sudanese,Uruguayan, etc) English      Your appointments     2017  2:20 PM   Follow Up Visit with Afua Roblero PA-C   [facility] (--)    [address] [place] [address], [address]  [facility] [address] [phone]           You will be receiving a confirmation call a few days before your appointment from our automated call confirmation system.              Problem List              ICD-10-CM Priority Class Noted - Resolved    Personal history of gout Z87.39   2011 - Present    Anxiety disorder F41.9   2013 - Present    Intractable migraine without aura and without status migrainosus G43.019   2015 - Present      R47.01   9/3/2015 - Present    Hypotension I95.9   2015 - Present    Migraine with aura and without status migrainosus, not intractable G43.109   2016 - Present    Low back pain M54.5   5/10/2016 - Present    Encounter for management and injection of injectable progestin contraceptive Z30.42   10/4/2016 - " Present    Chronic migraine G43.709   12/19/2016 - Present    Obesity (BMI 35.0-39.9 without comorbidity) ([facility]) E66.9   7/5/2017 - Present    Primary insomnia F51.01   8/9/2017 - Present      Health Maintenance        Date Due Completion Dates    IMM DTaP/Tdap/Td Vaccine (1 - Tdap) 8/1/2011 7/31/2011    IMM INFLUENZA (1) 9/1/2017 ---    PAP SMEAR 7/20/2018 7/20/2015, 6/24/2013 (Done)    [facility]            Current Immunizations     TD Vaccine 7/31/2011      Below and/or attached are the medications your provider expects you to take. Review all of your home medications and newly ordered medications with your provider and/or pharmacist. Follow medication instructions as directed by your provider and/or pharmacist. Please keep your medication list with you and share with your provider. Update the information when medications are discontinued, doses are changed, or new medications (including over-the-counter products) are added; and carry medication information at all times in the event of emergency situations     Allergies:  ALMOND (DIAGNOSTIC) - Itching     NKDA - (reactions not documented)               Medications  Valid as of: August 23, 2017 -  2:58 PM    Generic Name Brand Name Tablet Size Instructions for use    ALPRAZolam (Tab) XANAX 0.5 MG Take 1 Tab by mouth 2 times a day as needed for Anxiety (panic attacks).        Baclofen (Tab) LIORESAL 10 MG         Butalbital-APAP-Caff-Cod (Cap) FIORICET W/CODEINE -38-30 MG Take 1 Cap by mouth every four hours as needed for Headache.        Doxepin HCl (Cap) SINEQUAN 10 MG Take 1 Cap by mouth every evening.        Gabapentin (Cap) NEURONTIN 300 MG         Hydrocodone-Acetaminophen (Tab) NORCO 7.5-325 MG Take 1 Tab by mouth every 6 hours as needed.        Lidocaine (Patch) LIDODERM 5 %         Phentermine HCl (TABLET DISPERSIBLE) Phentermine HCl 15 MG Take 1 Tab by mouth 1 time daily as needed. For weight loss.  Take in the  morning.        Promethazine HCl (Tab) PHENERGAN 25 MG Take 1 Tab by mouth every bedtime.        SUMAtriptan Succinate (Solution Auto-injector) SUMAtriptan Succinate 4 MG/0.5ML Inject 1 Syringe as instructed as needed.        Suvorexant (Tab) Suvorexant 15 MG Take 1 Tab by mouth every evening. Allow at least 9 hours for sleep        Venlafaxine HCl (CAPSULE SR 24 HR) EFFEXOR- MG TAKE ONE CAPSULE BY MOUTH ONCE DAILY        Verapamil HCl (Tab) ISOPTIN 40 MG One tab PM and 2 tabs AM x 2 weeks; then 2 tabs AM and 2 tabs PM.        Verapamil HCl (CAPSULE SR 24 HR) CALAN  MG Take 1 Cap by mouth every day.        Zaleplon (Cap) SONATA 5 MG         Zolpidem Tartrate (Tab) AMBIEN 10 MG Take 1 Tab by mouth at bedtime as needed for Sleep.        .                 Medicines prescribed today were sent to:     [facility] [address] [place], [place] [address] [address] [address]    Phone: [phone] Fax: [phone]    Open 24 Hours?: No    [facility] - [place] [address]    [address] [address]    Phone: [phone] Fax: [phone]    Open 24 Hours?: No      Medication refill instructions:       If your prescription bottle indicates you have medication refills left, it is not necessary to call your provider’s office. Please contact your pharmacy and they will refill your medication.    If your prescription bottle indicates you do not have any refills left, you may request refills at any time through one of the following ways: The online [employer] system (except Urgent Care), by calling your provider’s office, or by asking your pharmacy to contact your provider’s office with a refill request. Medication refills are processed only during regular business hours and may not be available until the next business day. Your provider may request additional information or to have a follow-up visit with you prior to refilling your medication.   *Please  Note: Medication refills are assigned a new Rx number when refilled electronically. Your pharmacy may indicate that no refills were authorized even though a new prescription for the same medication is available at the pharmacy. Please request the medicine by name with the pharmacy before contacting your provider for a refill.        Instructions    Valerian - daily pill to take to reduce insomnia  Melatonin - 5 mg   Phenergan - 25 mg tablet to knock yourself out    Pursue medical marijuana card if your employer OKays          MyChart Access Code: Activation code not generated  Current [employer] Status: Active

## 2017-08-23 NOTE — PROGRESS NOTES
Cc:  Migraine followup    Last visit:    Pt had EKG which was fine to initiate verapamil.  Now taking 40 mg BID.  She has less headaches (1 less weekly) and less severe headache.      No side effects from the medication except she is experiencing increased bruising.    Zembrace injections - she felt it made headache worse for 30 minutes and then began to work.  She did experience some neck tightness.  Recommend trying 4 mg generic autoinjection instead.    Increased verapamil by 40 mg x 2 weeks and then again 40 mg to dose of 80 mg BID and monitor for headaches.    RTC 3 months.  Call in meantime if you need verapamil increased further    Interval History:    Verapamil: current dose 80 mg and 40 mg  Headache frequency definitely reduced with verapamil  Acute treatment with regular sumatriptan which works    PCP is changing her medication for depression/sleep problems.  Tried Belsomra but it was way too expensive.  Tried ambien but pain doctor won't let her take that.    Biggest complaint is poor sleep as it worsens headache frequency.    A/p:  Chronic migraine much improved on verapamil.  Will switch to extended release verapamil instead.  Will likely need repeat EKG at next visit.  Insomnia:  Begin going to gym and walk in swim lanes - need regular daily exercise.  Try alerian - daily pill to take to reduce insomnia  Melatonin - 5 mg   Phenergan - 25 mg tablet to knock yourself out    Pursue medical marijuana card if your employer OKays    Total time with this visit:   30  Minutes face-to-face with patient. More than 50% of this visit was spent educating patient on their illness and/or coordinating care, as detailed above    RTC 3 months

## 2017-08-30 ENCOUNTER — TELEPHONE (OUTPATIENT)
Dept: MEDICAL GROUP | Facility: LAB | Age: 37
End: 2017-08-30

## 2017-09-12 ENCOUNTER — NON-PROVIDER VISIT (OUTPATIENT)
Dept: MEDICAL GROUP | Facility: LAB | Age: 37
End: 2017-09-12
Payer: COMMERCIAL

## 2017-09-12 DIAGNOSIS — Z30.42 ENCOUNTER FOR MANAGEMENT AND INJECTION OF INJECTABLE PROGESTIN CONTRACEPTIVE: ICD-10-CM

## 2017-09-12 PROCEDURE — 96372 THER/PROPH/DIAG INJ SC/IM: CPT | Performed by: NURSE PRACTITIONER

## 2017-09-12 PROCEDURE — 99999 PR NO CHARGE: CPT | Performed by: FAMILY MEDICINE

## 2017-09-12 RX ORDER — MEDROXYPROGESTERONE ACETATE 150 MG/ML
150 INJECTION, SUSPENSION INTRAMUSCULAR ONCE
Status: COMPLETED | OUTPATIENT
Start: 2017-09-12 | End: 2017-09-12

## 2017-09-12 RX ADMIN — MEDROXYPROGESTERONE ACETATE 150 MG: 150 INJECTION, SUSPENSION INTRAMUSCULAR at 16:52

## 2017-09-12 NOTE — PROGRESS NOTES
Christal Roblero is a 37 y.o. here for a Depo Provera Injection.     Date of last Depo Provera Injection: 6/27/17  Current date within therapeutic range?: Yes   Urine pregnancy test done (needed if out of date range): not performed  Date of office visit:8/9/17  Date of last pap (if > 21 years old)/ GYN exam: 7/20/15  Dx: Contraceptive use    Order and dose verified by: CK  Patient tolerated injection and no adverse effects were observed or reported: Yes    # of Administrations remaining in MAR: 0  Next injection due between 11/28 and 12/12.

## 2017-09-12 NOTE — PROGRESS NOTES
I have placed the below orders and discussed them with Dr. Alba. the MA is performing the below orders under the direction of Dr. FAROOQ

## 2017-10-08 RX ORDER — VENLAFAXINE HYDROCHLORIDE 150 MG/1
CAPSULE, EXTENDED RELEASE ORAL
Qty: 90 CAP | Refills: 0 | Status: SHIPPED | OUTPATIENT
Start: 2017-10-08 | End: 2018-01-14 | Stop reason: SDUPTHER

## 2017-11-22 ENCOUNTER — TELEPHONE (OUTPATIENT)
Dept: MEDICAL GROUP | Facility: LAB | Age: 37
End: 2017-11-22

## 2017-11-22 DIAGNOSIS — H53.9 VISUAL DISTURBANCE: ICD-10-CM

## 2017-11-22 DIAGNOSIS — H53.8 BLURRED VISION, BILATERAL: Primary | ICD-10-CM

## 2017-11-22 NOTE — TELEPHONE ENCOUNTER
1. Caller Name: Christal Roblero                                         Call Back Number: [phone] (home) [phone] (work)      Patient approves a detailed voicemail message: yes    2. SPECIFIC Action To Be Taken: Referral pending, please sign.    3. Diagnosis/Clinical Reason for Request: H53.9 (ICD-10-CM) - Vision disturbance    4. Specialty & Provider Name/Lab/Imaging Location: [facility]     5. Is appointment scheduled for requested order/referral: no    Patient informed they will receive a return phone call from the office ONLY if there are any questions before processing their request. Advised to call back if they haven't received a call from the referral department in 5 days.

## 2017-11-29 ENCOUNTER — OFFICE VISIT (OUTPATIENT)
Dept: NEUROLOGY | Facility: MEDICAL CENTER | Age: 37
End: 2017-11-29
Payer: COMMERCIAL

## 2017-11-29 ENCOUNTER — NON-PROVIDER VISIT (OUTPATIENT)
Dept: MEDICAL GROUP | Facility: LAB | Age: 37
End: 2017-11-29
Payer: COMMERCIAL

## 2017-11-29 ENCOUNTER — TELEPHONE (OUTPATIENT)
Dept: MEDICAL GROUP | Facility: LAB | Age: 37
End: 2017-11-29

## 2017-11-29 VITALS
HEIGHT: 65 IN | SYSTOLIC BLOOD PRESSURE: 130 MMHG | WEIGHT: 229 LBS | DIASTOLIC BLOOD PRESSURE: 92 MMHG | TEMPERATURE: 98.1 F | HEART RATE: 82 BPM | BODY MASS INDEX: 38.15 KG/M2 | OXYGEN SATURATION: 96 %

## 2017-11-29 DIAGNOSIS — G43.109 MIGRAINE WITH AURA AND WITHOUT STATUS MIGRAINOSUS, NOT INTRACTABLE: ICD-10-CM

## 2017-11-29 DIAGNOSIS — Z30.42 ENCOUNTER FOR MANAGEMENT AND INJECTION OF INJECTABLE PROGESTIN CONTRACEPTIVE: ICD-10-CM

## 2017-11-29 PROCEDURE — 96372 THER/PROPH/DIAG INJ SC/IM: CPT | Performed by: NURSE PRACTITIONER

## 2017-11-29 PROCEDURE — 99213 OFFICE O/P EST LOW 20 MIN: CPT | Performed by: PHYSICIAN ASSISTANT

## 2017-11-29 RX ORDER — MEDROXYPROGESTERONE ACETATE 150 MG/ML
150 INJECTION, SUSPENSION INTRAMUSCULAR ONCE
Status: COMPLETED | OUTPATIENT
Start: 2017-11-29 | End: 2017-11-30

## 2017-11-29 RX ORDER — SUMATRIPTAN SUCCINATE 4 MG/.5ML
1 INJECTION, SOLUTION SUBCUTANEOUS PRN
Qty: 6 ML | Refills: 11 | Status: SHIPPED | OUTPATIENT
Start: 2017-11-29 | End: 2019-06-18

## 2017-11-29 RX ORDER — VERAPAMIL HYDROCHLORIDE 180 MG/1
180 CAPSULE, EXTENDED RELEASE ORAL DAILY
Qty: 30 CAP | Refills: 11 | Status: SHIPPED | OUTPATIENT
Start: 2017-11-29 | End: 2019-04-10

## 2017-11-29 NOTE — LETTER
November 29, 2017        RE:  Christal Roblero      To Whom It May Concern:    Ms. Roblero is a patient in our clinic, under my care.  She suffers from chronic migraines triggered by lights and made more severe by exposure to light.  She requires use of sunglasses or precision tinted eyewear to reduce triggering migraines.    If you have any questions, please contact us at the phone number above.    Sincerely,        Afua Roblero PA-C

## 2017-11-29 NOTE — PROGRESS NOTES
Cc:  Migraine followup    Last visit:    Verapamil: current dose 80 mg and 40 mg  Headache frequency definitely reduced with verapamil  Acute treatment with regular sumatriptan which works     PCP is changing her medication for depression/sleep problems.  Tried Belsomra but it was way too expensive.  Tried ambien but pain doctor won't let her take that.     Biggest complaint is poor sleep as it worsens headache frequency.     A/p:  Chronic migraine much improved on verapamil.  Will switch to extended release verapamil instead.  Will likely need repeat EKG at next visit.  Insomnia:  Begin going to gym and walk in swim lanes - need regular daily exercise.  Try alerian - daily pill to take to reduce insomnia  Melatonin - 5 mg   Phenergan - 25 mg tablet to knock yourself out     Pursue medical marijuana card if your employer OKays    Interval History:    Verapamil: current dose and efficacy - 120 mg.        Acute treatment:  Sumatriptan - has been working    Currently her headache frequency is bad due to changing all the lights to fluorescent at her work place.  She is wearing sunglasses but it is difficult to see and not allowed by her employer.      She also has tried the CBD spray and it also works really well.        A/P:  Chronic migraine on verapamil: We will increase to 160 mg.  Recommend precision tinted glasses.       Repeat EKG next visit.    RTC 2-3 months    Total time with this visit:  20   Minutes face-to-face with patient. More than 50% of this visit was spent educating patient on their illness and/or coordinating care, as detailed above

## 2017-11-30 RX ADMIN — MEDROXYPROGESTERONE ACETATE 150 MG: 150 INJECTION, SUSPENSION INTRAMUSCULAR at 08:10

## 2017-11-30 NOTE — TELEPHONE ENCOUNTER
1. Caller Name: Christal Roblero                                         Call Back Number: [phone] (home) [phone] (work)      Patient approves a detailed voicemail message: yes    Patient is on the MA Schedule today for Depo vaccine/injection.    SPECIFIC Action To Be Taken: Please order Depo

## 2018-01-15 RX ORDER — VENLAFAXINE HYDROCHLORIDE 150 MG/1
CAPSULE, EXTENDED RELEASE ORAL
Qty: 90 CAP | Refills: 4 | Status: SHIPPED | OUTPATIENT
Start: 2018-01-15 | End: 2019-02-04 | Stop reason: SDUPTHER

## 2018-01-15 NOTE — TELEPHONE ENCOUNTER
Was the patient seen in the last year in this department? Yes Lov 11/29/2017    Does patient have an active prescription for medications requested? No     Received Request Via: Pharmacy

## 2018-03-01 ENCOUNTER — APPOINTMENT (OUTPATIENT)
Dept: NEUROLOGY | Facility: MEDICAL CENTER | Age: 38
End: 2018-03-01
Payer: COMMERCIAL

## 2018-03-05 ENCOUNTER — APPOINTMENT (OUTPATIENT)
Dept: NEUROLOGY | Facility: MEDICAL CENTER | Age: 38
End: 2018-03-05
Payer: COMMERCIAL

## 2018-03-06 ENCOUNTER — NON-PROVIDER VISIT (OUTPATIENT)
Dept: MEDICAL GROUP | Facility: LAB | Age: 38
End: 2018-03-06
Payer: COMMERCIAL

## 2018-03-06 DIAGNOSIS — Z30.42 ENCOUNTER FOR SURVEILLANCE OF INJECTABLE CONTRACEPTIVE: ICD-10-CM

## 2018-03-06 LAB
INT CON NEG: NEGATIVE
INT CON POS: POSITIVE
POC URINE PREGNANCY TEST: NEGATIVE

## 2018-03-06 PROCEDURE — 96372 THER/PROPH/DIAG INJ SC/IM: CPT | Performed by: NURSE PRACTITIONER

## 2018-03-06 PROCEDURE — 81025 URINE PREGNANCY TEST: CPT | Performed by: NURSE PRACTITIONER

## 2018-03-06 RX ORDER — MEDROXYPROGESTERONE ACETATE 150 MG/ML
150 INJECTION, SUSPENSION INTRAMUSCULAR ONCE
Status: COMPLETED | OUTPATIENT
Start: 2018-03-06 | End: 2018-03-06

## 2018-03-06 RX ADMIN — MEDROXYPROGESTERONE ACETATE 150 MG: 150 INJECTION, SUSPENSION INTRAMUSCULAR at 16:50

## 2018-03-07 NOTE — PROGRESS NOTES
Christal Roblero is a 37 y.o. here for a Depo Provera Injection.     Date of last Depo Provera Injection: 11/23/17  Current date within therapeutic range?: No   Urine pregnancy test done (needed if out of date range): yes--3/6/18  /Date of office visit:3/6/18  Date of last pap (if > 21 years old)/ GYN exam: 7/20/15  Dx: Contraceptive use    Order and dose verified by: DF  Patient tolerated injection and no adverse effects were observed or reported: No    # of Administrations remaining in MAR: 0  Next injection due between May 22 and June 5.

## 2018-03-08 ENCOUNTER — OFFICE VISIT (OUTPATIENT)
Dept: MEDICAL GROUP | Facility: LAB | Age: 38
End: 2018-03-08
Payer: COMMERCIAL

## 2018-03-08 VITALS
BODY MASS INDEX: 44.37 KG/M2 | SYSTOLIC BLOOD PRESSURE: 118 MMHG | WEIGHT: 226 LBS | TEMPERATURE: 96.2 F | HEIGHT: 60 IN | OXYGEN SATURATION: 95 % | DIASTOLIC BLOOD PRESSURE: 80 MMHG | RESPIRATION RATE: 12 BRPM | HEART RATE: 88 BPM

## 2018-03-08 DIAGNOSIS — E66.9 OBESITY (BMI 35.0-39.9 WITHOUT COMORBIDITY): ICD-10-CM

## 2018-03-08 DIAGNOSIS — T46.1X5A ADVERSE EFFECT OF CALCIUM-CHANNEL BLOCKER, INITIAL ENCOUNTER: ICD-10-CM

## 2018-03-08 DIAGNOSIS — F51.01 PRIMARY INSOMNIA: ICD-10-CM

## 2018-03-08 PROCEDURE — 99214 OFFICE O/P EST MOD 30 MIN: CPT | Performed by: NURSE PRACTITIONER

## 2018-03-08 PROCEDURE — 93000 ELECTROCARDIOGRAM COMPLETE: CPT | Performed by: NURSE PRACTITIONER

## 2018-03-09 NOTE — PROGRESS NOTES
Chief Complaint   Patient presents with   • Migraine     Neuro Requires EKG for medication prescribed DispRefillsStartEnd       HPI   37-year-old established female here for EKG. She was started on verapamil by neurology and told to come here for an EKG. She is not having chest pain, dizziness or any episodes of passing out. She does feel that verapamil has decreased the frequency of her migraines. Verapamil does make her fill sleepy, she is taking it in the morning.    She's also concerned about her weight. She is struggling with losing weight despite trying to eat healthy. She does not exercise regularly. She has done really well with phentermine in the past and is requesting a refill. She denies any negative side effects of phentermine in the past such as chest pain, heart palpitations or increase in her anxiety.    Insomnia: Chronic issue. Has trouble falling and staying asleep. Currently taking a medicine specifically for sleep, came off of Seroquel quite some time ago because of weight gain.    Past medical, surgical, family, and social history is reviewed and updated in Epic chart by me today.   Medications and allergies reviewed and updated in Epic chart by me today.     ROS:   As documented in history of present illness above    Exam:  Blood pressure 118/80, pulse 88, temperature (!) 35.7 °C (96.2 °F), resp. rate 12, height 1.524 m (5'), weight 102.5 kg (226 lb), SpO2 95 %.  Constitutional: Alert, no distress, plus 3 vital signs  Skin:  Warm, dry, no rashes invisible areas  Eye: Equal, round and reactive, conjunctiva clear  ENMT: Lips without lesions, good dentition, oropharynx clear    Neck: Trachea midline, no masses, no thyromegaly  Respiratory: Unlabored respiration, lungs clear to auscultation, no wheezes, no rhonchi  Cardiovascular: Normal rate and rhythm, no murmur, no edema  Abdomen: Soft, nontender, no masses or hepatosplenomegaly  Psych: Alert, pleasant, well-groomed, normal affect    A/P:  1.  Obesity (BMI 35.0-39.9 without comorbidity)  -Reminded her of side effects of phentermine such as heart palpitations, hypertension and chest pain and she is agreeable to stopping should any of these symptoms start. Encouraged portion control and daily physical exercise.  - Phentermine HCl 15 MG TABLET DISPERSIBLE; Take 1 Tab by mouth 1 time daily as needed for up to 30 days. For weight loss.  Take in the morning.  Dispense: 30 Tab; Refill: 2    2. Chronic migraine  -Improving. EKG shows a normal sinus rhythm at 78 with a QTc interval of 436.  - EKG - Clinic performed    3. Adverse effect of calcium-channel blocker, initial encounter  - EKG - Clinic performed    4. Primary insomnia  -Encouraged her to change her verapamil to nighttime as it does make her sleepy. Also encouraged her to exercise during the day. We discussed good sleep hygiene.

## 2018-03-15 ENCOUNTER — OFFICE VISIT (OUTPATIENT)
Dept: NEUROLOGY | Facility: MEDICAL CENTER | Age: 38
End: 2018-03-15
Payer: COMMERCIAL

## 2018-03-15 VITALS
OXYGEN SATURATION: 96 % | TEMPERATURE: 98.1 F | SYSTOLIC BLOOD PRESSURE: 132 MMHG | DIASTOLIC BLOOD PRESSURE: 60 MMHG | BODY MASS INDEX: 37.99 KG/M2 | HEIGHT: 65 IN | RESPIRATION RATE: 14 BRPM | WEIGHT: 228 LBS | HEART RATE: 98 BPM

## 2018-03-15 PROCEDURE — 99213 OFFICE O/P EST LOW 20 MIN: CPT | Performed by: PHYSICIAN ASSISTANT

## 2018-03-15 RX ORDER — VERAPAMIL HYDROCHLORIDE 240 MG/1
240 TABLET, FILM COATED, EXTENDED RELEASE ORAL DAILY
Qty: 30 TAB | Refills: 11 | Status: SHIPPED | OUTPATIENT
Start: 2018-03-15 | End: 2019-06-18

## 2018-03-15 NOTE — PROGRESS NOTES
Cc:  Migraine follow up        Last visit:       Verapamil: current dose and efficacy - 120 mg.          Acute treatment:  Sumatriptan - has been working     Currently her headache frequency is bad due to changing all the lights to fluorescent at her work place.  She is wearing sunglasses but it is difficult to see and not allowed by her employer.       She also has tried the CBD spray and it also works really well.           A/P:  Chronic migraine on verapamil: We will increase to 160 mg.  Recommend precision tinted glasses.         Repeat EKG next visit.     RTC 2-3 months    Interval History:    Current dose of verapamil is 180 mg ER.    She is now back to getting headaches/migraines daily.  Things were better and then she was dx with dry eyes and started using drops.  She feels the increased/return to daily migraines happened around same time she started using the eye drops.  She has glasses tinted that she wears at work to block fluorescent light.    Dose for verapamil for migraine is 240-480 mg daily.    Reviewed ekg which I ordered recently and it was read as normal with no change from previous EKG.    RTC 4 months.  My chart in a month or so if she wishes to continue pushing up dose.    Total time with this visit:   15  Minutes face-to-face with patient. More than 50% of this visit was spent educating patient on their illness and/or coordinating care, as detailed above

## 2018-06-01 ENCOUNTER — NON-PROVIDER VISIT (OUTPATIENT)
Dept: MEDICAL GROUP | Facility: LAB | Age: 38
End: 2018-06-01
Payer: COMMERCIAL

## 2018-06-01 DIAGNOSIS — Z30.42 ENCOUNTER FOR SURVEILLANCE OF INJECTABLE CONTRACEPTIVE: ICD-10-CM

## 2018-06-01 PROCEDURE — 96372 THER/PROPH/DIAG INJ SC/IM: CPT | Performed by: FAMILY MEDICINE

## 2018-06-01 RX ORDER — MEDROXYPROGESTERONE ACETATE 150 MG/ML
150 INJECTION, SUSPENSION INTRAMUSCULAR ONCE
Status: COMPLETED | OUTPATIENT
Start: 2018-06-01 | End: 2018-06-01

## 2018-06-01 RX ADMIN — MEDROXYPROGESTERONE ACETATE 150 MG: 150 INJECTION, SUSPENSION INTRAMUSCULAR at 14:42

## 2018-06-01 NOTE — PROGRESS NOTES
Christal Roblero is a 37 y.o. here for a Depo Provera Injection.     Date of last Depo Provera Injection: 3/6/18  Current date within therapeutic range?: Yes   Urine pregnancy test done (needed if out of date range): not performed  Date of office visit: 3/8/18  Date of last pap (if > 21 years old)/ GYN exam: 7/20/15  Dx: Contraceptive use    Order and dose verified by: TF  Patient tolerated injection and no adverse effects were observed or reported: Yes    # of Administrations remaining in MAR:  Next injection due between Aug 17 and Aug 31.

## 2019-02-05 RX ORDER — VENLAFAXINE HYDROCHLORIDE 150 MG/1
CAPSULE, EXTENDED RELEASE ORAL
Qty: 30 CAP | Refills: 3 | Status: SHIPPED | OUTPATIENT
Start: 2019-02-05 | End: 2019-06-18

## 2019-02-05 NOTE — TELEPHONE ENCOUNTER
Was the patient seen in the last year in this department? Yes  LOV-3/8/18  Does patient have an active prescription for medications requested? No     Received Request Via: Pharmacy

## 2019-04-10 ENCOUNTER — OFFICE VISIT (OUTPATIENT)
Dept: MEDICAL GROUP | Facility: LAB | Age: 39
End: 2019-04-10
Payer: COMMERCIAL

## 2019-04-10 VITALS
DIASTOLIC BLOOD PRESSURE: 70 MMHG | HEIGHT: 60 IN | BODY MASS INDEX: 39.85 KG/M2 | RESPIRATION RATE: 12 BRPM | SYSTOLIC BLOOD PRESSURE: 108 MMHG | HEART RATE: 98 BPM | WEIGHT: 203 LBS | OXYGEN SATURATION: 96 %

## 2019-04-10 DIAGNOSIS — Z13.820 SCREENING FOR OSTEOPOROSIS: ICD-10-CM

## 2019-04-10 DIAGNOSIS — Z30.09 COUNSELING FOR BIRTH CONTROL REGARDING INTRAUTERINE DEVICE (IUD): ICD-10-CM

## 2019-04-10 DIAGNOSIS — F41.1 GENERALIZED ANXIETY DISORDER: ICD-10-CM

## 2019-04-10 DIAGNOSIS — Z12.31 ENCOUNTER FOR SCREENING MAMMOGRAM FOR BREAST CANCER: ICD-10-CM

## 2019-04-10 PROCEDURE — 99214 OFFICE O/P EST MOD 30 MIN: CPT | Performed by: NURSE PRACTITIONER

## 2019-04-10 RX ORDER — VENLAFAXINE HYDROCHLORIDE 75 MG/1
75 CAPSULE, EXTENDED RELEASE ORAL DAILY
Qty: 30 CAP | Refills: 3 | Status: SHIPPED | OUTPATIENT
Start: 2019-04-10 | End: 2019-06-18

## 2019-04-10 ASSESSMENT — PATIENT HEALTH QUESTIONNAIRE - PHQ9: CLINICAL INTERPRETATION OF PHQ2 SCORE: 0

## 2019-04-10 NOTE — PROGRESS NOTES
CC  F/u on contraception    HPI  38-year-old established female here to discuss contraception.  She has received Depo-Provera injections for most of her adult life, since she was 16 years old.  She has taken several year breaks in between Depo-Provera injections.  She is been consistently receiving Depo-Provera injections from  until 2018.  Her last Depo-Provera injection was 2018.  She like to discuss getting an IUD.  Without Depo-Provera since last , she has only spotted once in Dec and last Monday - only after intercourse.  Sexually active with condoms.     She suffers from chronic migraines and cannot use estrogen therapy.    Generalized anxiety disorder: Chronic issue.  Flaring up on her over the past year.  She like to increase her Effexor if possible.  She finds herself feeling overly anxious for what she calls no good reason.  Denies acute depression.  Anxiety feels like she is restless, shaky and her mind races.  Denies suicidal or homicidal ideations.  Denies any negative side effects of Effexor.    Past medical, surgical, family, and social history is reviewed and updated in Epic chart by me today.   Medications and allergies reviewed and updated in Epic chart by me today.     ROS:   As documented in history of present illness above    Exam:  /70 (BP Location: Left arm, Patient Position: Sitting, BP Cuff Size: Large adult)   Pulse 98   Resp 12   Ht 1.524 m (5')   Wt 92.1 kg (203 lb)   SpO2 96%   Constitutional: Alert, no distress, plus 3 vital signs  Skin:  Warm, dry, no rashes invisible areas  Eye: Equal, round and reactive, conjunctiva clear  ENMT: Lips without lesions  Respiratory: Unlabored respiration  Cardiovascular: Normal rate  Psych: Alert, pleasant, well-groomed, normal affect    Assessment / Plan / Medical Decision makin. Screening for osteoporosis  -recommend dexa scan.  Encouraged calcium / vit D and weight bearing exercise  - DS-BONE DENSITY STUDY (DEXA);  Future    2. Counseling for birth control regarding intrauterine device (IUD)  -I encouraged her to refrain from further Depo-Provera injections which she is in agreement with.  She would like to get an IUD and this was ordered for her today.  When we bring her back for IUD insertion, will also do her Pap smear.    3. Encounter for screening mammogram for breast cancer  - MA-SCREENING MAMMO BILAT W/TOMOSYNTHESIS W/CAD; Future    4. Generalized anxiety disorder  -Increased her venlafaxine to 225 mg.  Discussed potential side effects of increasing venlafaxine.  I will see her back here for Pap, IUD insertion and to follow-up on anxiety in a few weeks.  Discussed the importance of avoiding caffeine with her current anxiety level and to make sure that she is exercising.  - venlafaxine XR (EFFEXOR XR) 75 MG CAPSULE SR 24 HR; Take 1 Cap by mouth every day. Add to 150 mg effexor  Dispense: 30 Cap; Refill: 3

## 2019-04-11 ENCOUNTER — OFFICE VISIT (OUTPATIENT)
Dept: URGENT CARE | Facility: CLINIC | Age: 39
End: 2019-04-11
Payer: COMMERCIAL

## 2019-04-11 VITALS
DIASTOLIC BLOOD PRESSURE: 86 MMHG | RESPIRATION RATE: 13 BRPM | HEIGHT: 60 IN | WEIGHT: 203 LBS | SYSTOLIC BLOOD PRESSURE: 132 MMHG | HEART RATE: 95 BPM | OXYGEN SATURATION: 100 % | BODY MASS INDEX: 39.85 KG/M2 | TEMPERATURE: 99.1 F

## 2019-04-11 DIAGNOSIS — J03.90 EXUDATIVE TONSILLITIS: ICD-10-CM

## 2019-04-11 LAB
HETEROPH AB SER QL LA: NEGATIVE
INT CON NEG: NEGATIVE
INT CON NEG: NEGATIVE
INT CON POS: POSITIVE
INT CON POS: POSITIVE
S PYO AG THROAT QL: NEGATIVE

## 2019-04-11 PROCEDURE — 86308 HETEROPHILE ANTIBODY SCREEN: CPT | Performed by: PHYSICIAN ASSISTANT

## 2019-04-11 PROCEDURE — 87880 STREP A ASSAY W/OPTIC: CPT | Performed by: PHYSICIAN ASSISTANT

## 2019-04-11 PROCEDURE — 99214 OFFICE O/P EST MOD 30 MIN: CPT | Performed by: PHYSICIAN ASSISTANT

## 2019-04-11 RX ORDER — AMOXICILLIN 500 MG/1
500 CAPSULE ORAL 3 TIMES DAILY
Qty: 30 CAP | Refills: 0 | Status: SHIPPED | OUTPATIENT
Start: 2019-04-11 | End: 2019-04-21

## 2019-04-11 ASSESSMENT — ENCOUNTER SYMPTOMS
SHORTNESS OF BREATH: 0
SORE THROAT: 1
VOMITING: 0
ABDOMINAL PAIN: 0
CHILLS: 1
TROUBLE SWALLOWING: 1
SINUS PAIN: 0
COUGH: 0
PALPITATIONS: 0
BLURRED VISION: 0
HEADACHES: 1
MYALGIAS: 1
SWOLLEN GLANDS: 1
NAUSEA: 0
DIZZINESS: 0
EYE PAIN: 0
FEVER: 1
DIARRHEA: 0

## 2019-04-11 NOTE — LETTER
April 11, 2019         Patient: Christal Roblero   YOB: 1980   Date of Visit: 4/11/2019           To Whom it May Concern:    Christal Roblero was seen in my clinic on 4/11/2019. She may return to work on 4/15/2019.    If you have any questions or concerns, please don't hesitate to call.        Sincerely,           Blank Ribeiro P.A.-C.  Electronically Signed

## 2019-04-12 ENCOUNTER — TELEPHONE (OUTPATIENT)
Dept: MEDICAL GROUP | Facility: LAB | Age: 39
End: 2019-04-12

## 2019-04-12 NOTE — PATIENT INSTRUCTIONS
Tonsillitis  Tonsillitis is an infection of the throat that causes the tonsils to become red, tender, and swollen. Tonsils are collections of lymphoid tissue at the back of the throat. Each tonsil has crevices (crypts). Tonsils help fight nose and throat infections and keep infection from spreading to other parts of the body for the first 18 months of life.  What are the causes?  Sudden (acute) tonsillitis is usually caused by infection with streptococcal bacteria. Long-lasting (chronic) tonsillitis occurs when the crypts of the tonsils become filled with pieces of food and bacteria, which makes it easy for the tonsils to become repeatedly infected.  What are the signs or symptoms?  Symptoms of tonsillitis include:  · A sore throat, with possible difficulty swallowing.  · White patches on the tonsils.  · Fever.  · Tiredness.  · New episodes of snoring during sleep, when you did not snore before.  · Small, foul-smelling, yellowish-white pieces of material (tonsilloliths) that you occasionally cough up or spit out. The tonsilloliths can also cause you to have bad breath.  How is this diagnosed?  Tonsillitis can be diagnosed through a physical exam. Diagnosis can be confirmed with the results of lab tests, including a throat culture.  How is this treated?  The goals of tonsillitis treatment include the reduction of the severity and duration of symptoms and prevention of associated conditions. Symptoms of tonsillitis can be improved with the use of steroids to reduce the swelling. Tonsillitis caused by bacteria can be treated with antibiotic medicines. Usually, treatment with antibiotic medicines is started before the cause of the tonsillitis is known. However, if it is determined that the cause is not bacterial, antibiotic medicines will not treat the tonsillitis. If attacks of tonsillitis are severe and frequent, your health care provider may recommend surgery to remove the tonsils (tonsillectomy).  Follow these  instructions at home:  · Rest as much as possible and get plenty of sleep.  · Drink plenty of fluids. While the throat is very sore, eat soft foods or liquids, such as sherbet, soups, or instant breakfast drinks.  · Eat frozen ice pops.  · Gargle with a warm or cold liquid to help soothe the throat. Mix 1/4 teaspoon of salt and 1/4 teaspoon of baking soda in 8 oz of water.  Contact a health care provider if:  · Large, tender lumps develop in your neck.  · A rash develops.  · A green, yellow-brown, or bloody substance is coughed up.  · You are unable to swallow liquids or food for 24 hours.  · You notice that only one of the tonsils is swollen.  Get help right away if:  · You develop any new symptoms such as vomiting, severe headache, stiff neck, chest pain, or trouble breathing or swallowing.  · You have severe throat pain along with drooling or voice changes.  · You have severe pain, unrelieved with recommended medications.  · You are unable to fully open the mouth.  · You develop redness, swelling, or severe pain anywhere in the neck.  · You have a fever.  This information is not intended to replace advice given to you by your health care provider. Make sure you discuss any questions you have with your health care provider.  Document Released: 09/27/2006 Document Revised: 05/25/2017 Document Reviewed: 06/06/2014  [employer] Interactive Patient Education © 2017 [employer]

## 2019-04-12 NOTE — PROGRESS NOTES
Subjective:      Christal Roblero is a 38 y.o. female who presents with Sore Throat      Pharyngitis    This is a new problem. The current episode started in the past 7 days (Started 2 days ago). The problem has been gradually worsening. The pain is worse on the right side. Maximum temperature: Subjective fever. The fever has been present for less than 1 day. The pain is moderate. Associated symptoms include ear pain, headaches, a plugged ear sensation, swollen glands and trouble swallowing. Pertinent negatives include no abdominal pain, congestion, coughing, diarrhea, ear discharge, shortness of breath or vomiting. She has had no exposure to strep or mono. She has tried NSAIDs (DayQuil) for the symptoms. The treatment provided no relief.       Review of Systems   Constitutional: Positive for chills, fever and malaise/fatigue.   HENT: Positive for ear pain, sore throat and trouble swallowing. Negative for congestion, ear discharge and sinus pain.    Eyes: Negative for blurred vision and pain.   Respiratory: Negative for cough and shortness of breath.    Cardiovascular: Negative for chest pain and palpitations.   Gastrointestinal: Negative for abdominal pain, diarrhea, nausea and vomiting.   Musculoskeletal: Positive for myalgias.   Skin: Negative for rash.   Neurological: Positive for headaches. Negative for dizziness.       PMH:  has a past medical history of Allergy; Anxiety; Gout; Headache(784.0); and Migraine.  MEDS:   Current Outpatient Prescriptions:   •  amoxicillin (AMOXIL) 500 MG Cap, Take 1 Cap by mouth 3 times a day for 10 days., Disp: 30 Cap, Rfl: 0  •  venlafaxine XR (EFFEXOR XR) 75 MG CAPSULE SR 24 HR, Take 1 Cap by mouth every day. Add to 150 mg effexor, Disp: 30 Cap, Rfl: 3  •  venlafaxine (EFFEXOR-XR) 150 MG extended-release capsule, TAKE ONE CAPSULE BY MOUTH ONCE DAILY, Disp: 30 Cap, Rfl: 3  •  verapamil ER (CALAN-SR) 240 MG Tab CR, Take 1 Tab by mouth every day., Disp: 30 Tab, Rfl: 11  •   SUMAtriptan Succinate 4 MG/0.5ML Solution Auto-injector, Inject 1 Syringe as instructed as needed. (Patient not taking: Reported on 4/11/2019), Disp: 6 mL, Rfl: 11  •  lidocaine (LIDODERM) 5 % Patch, , Disp: , Rfl:   ALLERGIES:   Allergies   Allergen Reactions   • Junction (Diagnostic) Itching     Throat Itchiness   • Nkda [No Known Drug Allergy]      SURGHX: No past surgical history on file.  SOCHX:  reports that she has never smoked. She has never used smokeless tobacco. She reports that she drinks alcohol. She reports that she does not use drugs.  FH: Family history was reviewed, no pertinent findings to report     Objective:     /86   Pulse 95   Temp 37.3 °C (99.1 °F) (Temporal)   Resp 13   Ht 1.524 m (5')   Wt 92.1 kg (203 lb)   SpO2 100%   BMI 39.65 kg/m²      Physical Exam   Constitutional: She is oriented to person, place, and time. She appears well-developed and well-nourished.   HENT:   Head: Normocephalic and atraumatic.   Right Ear: Tympanic membrane, external ear and ear canal normal.   Left Ear: Tympanic membrane, external ear and ear canal normal.   Nose: Nose normal.   Mouth/Throat: Uvula is midline and mucous membranes are normal. No trismus in the jaw. No uvula swelling. Posterior oropharyngeal edema and posterior oropharyngeal erythema present. No tonsillar abscesses. Tonsils are 3+ on the right. Tonsils are 3+ on the left. Tonsillar exudate.   Eyes: Pupils are equal, round, and reactive to light. Conjunctivae are normal.   Neck: Normal range of motion.   Cardiovascular: Normal rate, regular rhythm and normal heart sounds.    No murmur heard.  Pulmonary/Chest: Effort normal and breath sounds normal. She has no wheezes.   Lymphadenopathy:     She has cervical adenopathy.   Neurological: She is alert and oriented to person, place, and time.   Skin: Skin is warm and dry. Capillary refill takes less than 2 seconds.   Psychiatric: She has a normal mood and affect. Her behavior is normal.  Judgment normal.       POCT Rapid Strep A - Negative    POCT Mononucleosis (mono) - Negative       Assessment/Plan:     1. Exudative tonsillitis  - POCT Rapid Strep A  - POCT Mononucleosis (mono)  - amoxicillin (AMOXIL) 500 MG Cap; Take 1 Cap by mouth 3 times a day for 10 days.  Dispense: 30 Cap; Refill: 0  - PO fluids  - Rest  - Tylenol or ibuprofen as needed for fever > 100.4 F  - Note given for work      Differential Diagnosis, natural history, and supportive care discussed. Return to the Urgent Care or follow up with your PCP if symptoms fail to resolve, or for any new or worsening symptoms. Emergency room precautions discussed. Patient and/or family appears understanding of information.

## 2019-05-03 ENCOUNTER — HOSPITAL ENCOUNTER (OUTPATIENT)
Dept: RADIOLOGY | Facility: MEDICAL CENTER | Age: 39
End: 2019-05-03
Attending: NURSE PRACTITIONER
Payer: COMMERCIAL

## 2019-05-03 DIAGNOSIS — Z13.820 SCREENING FOR OSTEOPOROSIS: ICD-10-CM

## 2019-05-03 DIAGNOSIS — Z12.31 ENCOUNTER FOR SCREENING MAMMOGRAM FOR BREAST CANCER: ICD-10-CM

## 2019-05-03 PROCEDURE — 77063 BREAST TOMOSYNTHESIS BI: CPT

## 2019-05-03 PROCEDURE — 77080 DXA BONE DENSITY AXIAL: CPT

## 2019-05-06 DIAGNOSIS — R92.8 ABNORMAL MAMMOGRAM OF RIGHT BREAST: ICD-10-CM

## 2019-05-09 ENCOUNTER — HOSPITAL ENCOUNTER (OUTPATIENT)
Dept: RADIOLOGY | Facility: MEDICAL CENTER | Age: 39
End: 2019-05-09
Attending: NURSE PRACTITIONER
Payer: COMMERCIAL

## 2019-05-09 DIAGNOSIS — R92.8 ABNORMAL MAMMOGRAM: ICD-10-CM

## 2019-05-09 PROCEDURE — G0279 TOMOSYNTHESIS, MAMMO: HCPCS | Mod: RT

## 2019-05-09 PROCEDURE — 76642 ULTRASOUND BREAST LIMITED: CPT | Mod: RT

## 2019-06-18 ENCOUNTER — OFFICE VISIT (OUTPATIENT)
Dept: MEDICAL GROUP | Facility: LAB | Age: 39
End: 2019-06-18
Payer: COMMERCIAL

## 2019-06-18 VITALS
HEART RATE: 76 BPM | OXYGEN SATURATION: 99 % | TEMPERATURE: 98.6 F | HEIGHT: 60 IN | BODY MASS INDEX: 40.05 KG/M2 | WEIGHT: 204 LBS | SYSTOLIC BLOOD PRESSURE: 110 MMHG | DIASTOLIC BLOOD PRESSURE: 74 MMHG | RESPIRATION RATE: 12 BRPM

## 2019-06-18 DIAGNOSIS — F41.1 GAD (GENERALIZED ANXIETY DISORDER): ICD-10-CM

## 2019-06-18 DIAGNOSIS — Z3A.01 LESS THAN 8 WEEKS GESTATION OF PREGNANCY: ICD-10-CM

## 2019-06-18 DIAGNOSIS — G43.109 MIGRAINE WITH AURA AND WITHOUT STATUS MIGRAINOSUS, NOT INTRACTABLE: ICD-10-CM

## 2019-06-18 PROCEDURE — 99214 OFFICE O/P EST MOD 30 MIN: CPT | Performed by: NURSE PRACTITIONER

## 2019-06-18 NOTE — PROGRESS NOTES
Chief Complaint   Patient presents with   • Medication Management       HPI  Christal is a 40 yo est female here with positive pregnancy test.  LMP 2019 - 5/3/2019.  Took pregnancy test one week ago.  + nausea and breast pain.  No vomiting, vaginal bleeding or abdominal pain.    Only prescription medication was Effexor for generalized anxiety disorder - stopped last Wednesday and anxiety is flaring but improving.  Denies any other negative side effects of abruptly stopping Effexor, surprisingly.  Denies acute depression.  Migraines are doing fine, she was on verapamil for this but ran out several weeks ago and has not had a migraine since.    Her last child was born about 20 years ago.  This is a desired pregnancy.  She is a non-smoker and rarely drinks alcohol.  Does not exercise and tells me she really does not eat very healthy.  She has not established with an OB/GYN.  She ironically was awaiting an IUD.  Stopped using Depo-Provera last .    Past medical, surgical, family, and social history is reviewed and updated in Epic chart by me today.   Medications and allergies reviewed and updated in Epic chart by me today.     ROS:   As documented in history of present illness above    Exam:  /74 (BP Location: Left arm, Patient Position: Sitting, BP Cuff Size: Large adult)   Pulse 76   Temp 37 °C (98.6 °F)   Resp 12   Ht 1.524 m (5')   Wt 92.5 kg (204 lb)   SpO2 99%   Constitutional: Alert, no distress, plus 3 vital signs  Skin:  Warm, dry, no rashes invisible areas  Eye: Equal, round and reactive, conjunctiva clear  ENMT: Lips without lesions  Respiratory: Unlabored respiration, lungs clear to auscultation, no wheezes, no rhonchi  Cardiovascular: Normal rate and rhythm  Psych: Alert, pleasant, well-groomed, normal affect    Assessment / Plan / Medical Decision makin. Less than 8 weeks gestation of pregnancy     2. ANN (generalized anxiety disorder)     3. Migraine with aura and without status  migrainosus, not intractable         -7 weeks and 2 d pregnant, desired pregnancy and feeling well.  She plans on calling [facility] pregnancy center this afternoon to schedule an appointment.  Discussed that she is considered an advanced maternal age pregnancy and needs to be seen within the next 2 to 3 weeks if possible.  -We had a discussion about good nutrition and pregnancy.  -Off all prescription medication.  Does not smoke or drink alcohol.  Encouraged a prenatal vitamin.  In terms of her anxiety, right now she would like to remain off of an antianxiety medication but will contact me if her anxiety is interrupting her normal activities of daily living. No migraine flare ups in the past few weeks - may use tylenol if needed.    -We do have her IUD in stock, will deliver this to her OB/GYN once she establishes care in case she would like this inserted postpartum.

## 2019-07-26 ENCOUNTER — ANESTHESIA EVENT (OUTPATIENT)
Dept: SURGERY | Facility: MEDICAL CENTER | Age: 39
End: 2019-07-26
Payer: COMMERCIAL

## 2019-07-26 ENCOUNTER — ANESTHESIA (OUTPATIENT)
Dept: SURGERY | Facility: MEDICAL CENTER | Age: 39
End: 2019-07-26
Payer: COMMERCIAL

## 2019-07-26 ENCOUNTER — HOSPITAL ENCOUNTER (OUTPATIENT)
Facility: MEDICAL CENTER | Age: 39
End: 2019-07-26
Attending: EMERGENCY MEDICINE | Admitting: OBSTETRICS & GYNECOLOGY
Payer: COMMERCIAL

## 2019-07-26 ENCOUNTER — APPOINTMENT (OUTPATIENT)
Dept: RADIOLOGY | Facility: MEDICAL CENTER | Age: 39
End: 2019-07-26
Attending: EMERGENCY MEDICINE
Payer: COMMERCIAL

## 2019-07-26 VITALS
TEMPERATURE: 97.5 F | SYSTOLIC BLOOD PRESSURE: 113 MMHG | RESPIRATION RATE: 16 BRPM | DIASTOLIC BLOOD PRESSURE: 78 MMHG | BODY MASS INDEX: 32.31 KG/M2 | WEIGHT: 201.06 LBS | HEIGHT: 66 IN | HEART RATE: 81 BPM | OXYGEN SATURATION: 95 %

## 2019-07-26 DIAGNOSIS — O03.9 MISCARRIAGE: ICD-10-CM

## 2019-07-26 PROBLEM — O02.1 MISSED ABORTION: Status: ACTIVE | Noted: 2019-07-26

## 2019-07-26 PROBLEM — Z92.84 HISTORY OF UNINTENDED AWARENESS UNDER GENERAL ANESTHESIA: Status: ACTIVE | Noted: 2019-07-26

## 2019-07-26 LAB
ABO + RH BLD: NORMAL
ABO GROUP BLD: NORMAL
APPEARANCE UR: ABNORMAL
B-HCG SERPL-ACNC: 3731 MIU/ML (ref 0–5)
BACTERIA #/AREA URNS HPF: ABNORMAL /HPF
BASOPHILS # BLD AUTO: 0.4 % (ref 0–1.8)
BASOPHILS # BLD: 0.04 K/UL (ref 0–0.12)
BILIRUB UR QL STRIP.AUTO: NEGATIVE
BLD GP AB SCN SERPL QL: NORMAL
CAOX CRY #/AREA URNS HPF: ABNORMAL /HPF
COLOR UR: ABNORMAL
EOSINOPHIL # BLD AUTO: 0.06 K/UL (ref 0–0.51)
EOSINOPHIL NFR BLD: 0.7 % (ref 0–6.9)
EPI CELLS #/AREA URNS HPF: ABNORMAL /HPF
ERYTHROCYTE [DISTWIDTH] IN BLOOD BY AUTOMATED COUNT: 43.2 FL (ref 35.9–50)
GLUCOSE UR STRIP.AUTO-MCNC: NEGATIVE MG/DL
HCT VFR BLD AUTO: 45.1 % (ref 37–47)
HGB BLD-MCNC: 14.5 G/DL (ref 12–16)
IMM GRANULOCYTES # BLD AUTO: 0.03 K/UL (ref 0–0.11)
IMM GRANULOCYTES NFR BLD AUTO: 0.3 % (ref 0–0.9)
KETONES UR STRIP.AUTO-MCNC: ABNORMAL MG/DL
LEUKOCYTE ESTERASE UR QL STRIP.AUTO: ABNORMAL
LYMPHOCYTES # BLD AUTO: 2.66 K/UL (ref 1–4.8)
LYMPHOCYTES NFR BLD: 29.4 % (ref 22–41)
MCH RBC QN AUTO: 28.5 PG (ref 27–33)
MCHC RBC AUTO-ENTMCNC: 32.2 G/DL (ref 33.6–35)
MCV RBC AUTO: 88.8 FL (ref 81.4–97.8)
MICRO URNS: ABNORMAL
MONOCYTES # BLD AUTO: 0.67 K/UL (ref 0–0.85)
MONOCYTES NFR BLD AUTO: 7.4 % (ref 0–13.4)
NEUTROPHILS # BLD AUTO: 5.6 K/UL (ref 2–7.15)
NEUTROPHILS NFR BLD: 61.8 % (ref 44–72)
NITRITE UR QL STRIP.AUTO: NEGATIVE
NRBC # BLD AUTO: 0 K/UL
NRBC BLD-RTO: 0 /100 WBC
NUMBER OF RH DOSES IND 8505RD: NORMAL
PH UR STRIP.AUTO: 5 [PH]
PLATELET # BLD AUTO: 284 K/UL (ref 164–446)
PMV BLD AUTO: 9.1 FL (ref 9–12.9)
PROT UR QL STRIP: NEGATIVE MG/DL
RBC # BLD AUTO: 5.08 M/UL (ref 4.2–5.4)
RBC # URNS HPF: ABNORMAL /HPF
RBC UR QL AUTO: NEGATIVE
RH BLD: NORMAL
RH BLD: NORMAL
SP GR UR STRIP.AUTO: 1.03
UROBILINOGEN UR STRIP.AUTO-MCNC: 1 MG/DL
WBC # BLD AUTO: 9.1 K/UL (ref 4.8–10.8)
WBC #/AREA URNS HPF: ABNORMAL /HPF

## 2019-07-26 PROCEDURE — 160009 HCHG ANES TIME/MIN: Performed by: OBSTETRICS & GYNECOLOGY

## 2019-07-26 PROCEDURE — 160028 HCHG SURGERY MINUTES - 1ST 30 MINS LEVEL 3: Performed by: OBSTETRICS & GYNECOLOGY

## 2019-07-26 PROCEDURE — 99291 CRITICAL CARE FIRST HOUR: CPT

## 2019-07-26 PROCEDURE — 700102 HCHG RX REV CODE 250 W/ 637 OVERRIDE(OP): Performed by: ANESTHESIOLOGY

## 2019-07-26 PROCEDURE — 160025 RECOVERY II MINUTES (STATS): Performed by: OBSTETRICS & GYNECOLOGY

## 2019-07-26 PROCEDURE — 160002 HCHG RECOVERY MINUTES (STAT): Performed by: OBSTETRICS & GYNECOLOGY

## 2019-07-26 PROCEDURE — 160036 HCHG PACU - EA ADDL 30 MINS PHASE I: Performed by: OBSTETRICS & GYNECOLOGY

## 2019-07-26 PROCEDURE — 700105 HCHG RX REV CODE 258: Performed by: ANESTHESIOLOGY

## 2019-07-26 PROCEDURE — 85025 COMPLETE CBC W/AUTO DIFF WBC: CPT

## 2019-07-26 PROCEDURE — 81001 URINALYSIS AUTO W/SCOPE: CPT

## 2019-07-26 PROCEDURE — 700101 HCHG RX REV CODE 250: Performed by: ANESTHESIOLOGY

## 2019-07-26 PROCEDURE — 160039 HCHG SURGERY MINUTES - EA ADDL 1 MIN LEVEL 3: Performed by: OBSTETRICS & GYNECOLOGY

## 2019-07-26 PROCEDURE — 160048 HCHG OR STATISTICAL LEVEL 1-5: Performed by: OBSTETRICS & GYNECOLOGY

## 2019-07-26 PROCEDURE — 87086 URINE CULTURE/COLONY COUNT: CPT

## 2019-07-26 PROCEDURE — 700111 HCHG RX REV CODE 636 W/ 250 OVERRIDE (IP): Performed by: ANESTHESIOLOGY

## 2019-07-26 PROCEDURE — 36415 COLL VENOUS BLD VENIPUNCTURE: CPT

## 2019-07-26 PROCEDURE — G0378 HOSPITAL OBSERVATION PER HR: HCPCS

## 2019-07-26 PROCEDURE — 86850 RBC ANTIBODY SCREEN: CPT

## 2019-07-26 PROCEDURE — 76817 TRANSVAGINAL US OBSTETRIC: CPT

## 2019-07-26 PROCEDURE — A9270 NON-COVERED ITEM OR SERVICE: HCPCS | Performed by: ANESTHESIOLOGY

## 2019-07-26 PROCEDURE — 700111 HCHG RX REV CODE 636 W/ 250 OVERRIDE (IP): Performed by: OBSTETRICS & GYNECOLOGY

## 2019-07-26 PROCEDURE — 86901 BLOOD TYPING SEROLOGIC RH(D): CPT

## 2019-07-26 PROCEDURE — 160046 HCHG PACU - 1ST 60 MINS PHASE II: Performed by: OBSTETRICS & GYNECOLOGY

## 2019-07-26 PROCEDURE — 160035 HCHG PACU - 1ST 60 MINS PHASE I: Performed by: OBSTETRICS & GYNECOLOGY

## 2019-07-26 PROCEDURE — 86900 BLOOD TYPING SEROLOGIC ABO: CPT

## 2019-07-26 PROCEDURE — 502587 HCHG PACK, D&C: Performed by: OBSTETRICS & GYNECOLOGY

## 2019-07-26 PROCEDURE — 84702 CHORIONIC GONADOTROPIN TEST: CPT

## 2019-07-26 PROCEDURE — 88305 TISSUE EXAM BY PATHOLOGIST: CPT

## 2019-07-26 RX ORDER — SCOLOPAMINE TRANSDERMAL SYSTEM 1 MG/1
1 PATCH, EXTENDED RELEASE TRANSDERMAL
Status: DISCONTINUED | OUTPATIENT
Start: 2019-07-26 | End: 2019-07-26 | Stop reason: HOSPADM

## 2019-07-26 RX ORDER — DEXAMETHASONE SODIUM PHOSPHATE 4 MG/ML
INJECTION, SOLUTION INTRA-ARTICULAR; INTRALESIONAL; INTRAMUSCULAR; INTRAVENOUS; SOFT TISSUE PRN
Status: DISCONTINUED | OUTPATIENT
Start: 2019-07-26 | End: 2019-07-26 | Stop reason: SURG

## 2019-07-26 RX ORDER — ONDANSETRON 2 MG/ML
INJECTION INTRAMUSCULAR; INTRAVENOUS PRN
Status: DISCONTINUED | OUTPATIENT
Start: 2019-07-26 | End: 2019-07-26 | Stop reason: SURG

## 2019-07-26 RX ORDER — CEFAZOLIN SODIUM 1 G/3ML
INJECTION, POWDER, FOR SOLUTION INTRAMUSCULAR; INTRAVENOUS PRN
Status: DISCONTINUED | OUTPATIENT
Start: 2019-07-26 | End: 2019-07-26 | Stop reason: SURG

## 2019-07-26 RX ORDER — SODIUM CHLORIDE, SODIUM LACTATE, POTASSIUM CHLORIDE, CALCIUM CHLORIDE 600; 310; 30; 20 MG/100ML; MG/100ML; MG/100ML; MG/100ML
INJECTION, SOLUTION INTRAVENOUS
Status: DISCONTINUED | OUTPATIENT
Start: 2019-07-26 | End: 2019-07-26 | Stop reason: SURG

## 2019-07-26 RX ORDER — MORPHINE SULFATE 10 MG/ML
5 INJECTION, SOLUTION INTRAMUSCULAR; INTRAVENOUS
Status: DISCONTINUED | OUTPATIENT
Start: 2019-07-26 | End: 2019-07-26 | Stop reason: HOSPADM

## 2019-07-26 RX ORDER — IBUPROFEN 600 MG/1
600 TABLET ORAL EVERY 6 HOURS PRN
Qty: 30 TAB | Refills: 0 | Status: SHIPPED | OUTPATIENT
Start: 2019-07-26 | End: 2022-07-05

## 2019-07-26 RX ORDER — ONDANSETRON 2 MG/ML
4 INJECTION INTRAMUSCULAR; INTRAVENOUS
Status: DISCONTINUED | OUTPATIENT
Start: 2019-07-26 | End: 2019-07-26 | Stop reason: HOSPADM

## 2019-07-26 RX ORDER — DOCUSATE SODIUM 100 MG/1
100 CAPSULE, LIQUID FILLED ORAL 2 TIMES DAILY PRN
Status: CANCELLED | OUTPATIENT
Start: 2019-07-26

## 2019-07-26 RX ORDER — MEPERIDINE HYDROCHLORIDE 25 MG/ML
6.25 INJECTION INTRAMUSCULAR; INTRAVENOUS; SUBCUTANEOUS
Status: DISCONTINUED | OUTPATIENT
Start: 2019-07-26 | End: 2019-07-26 | Stop reason: HOSPADM

## 2019-07-26 RX ORDER — MIDAZOLAM HYDROCHLORIDE 1 MG/ML
1 INJECTION INTRAMUSCULAR; INTRAVENOUS
Status: DISCONTINUED | OUTPATIENT
Start: 2019-07-26 | End: 2019-07-26 | Stop reason: HOSPADM

## 2019-07-26 RX ORDER — KETOROLAC TROMETHAMINE 30 MG/ML
INJECTION, SOLUTION INTRAMUSCULAR; INTRAVENOUS PRN
Status: DISCONTINUED | OUTPATIENT
Start: 2019-07-26 | End: 2019-07-26 | Stop reason: SURG

## 2019-07-26 RX ORDER — MORPHINE SULFATE 4 MG/ML
1 INJECTION, SOLUTION INTRAMUSCULAR; INTRAVENOUS
Status: DISCONTINUED | OUTPATIENT
Start: 2019-07-26 | End: 2019-07-26 | Stop reason: HOSPADM

## 2019-07-26 RX ORDER — VITAMIN A ACETATE, BETA CAROTENE, ASCORBIC ACID, CHOLECALCIFEROL, .ALPHA.-TOCOPHEROL ACETATE, DL-, THIAMINE MONONITRATE, RIBOFLAVIN, NIACINAMIDE, PYRIDOXINE HYDROCHLORIDE, FOLIC ACID, CYANOCOBALAMIN, CALCIUM CARBONATE, FERROUS FUMARATE, ZINC OXIDE, CUPRIC OXIDE 3080; 12; 120; 400; 1; 1.84; 3; 20; 22; 920; 25; 200; 27; 10; 2 [IU]/1; UG/1; MG/1; [IU]/1; MG/1; MG/1; MG/1; MG/1; MG/1; [IU]/1; MG/1; MG/1; MG/1; MG/1; MG/1
1 TABLET, FILM COATED ORAL EVERY MORNING
Status: CANCELLED | OUTPATIENT
Start: 2019-07-26

## 2019-07-26 RX ORDER — SODIUM CHLORIDE, SODIUM LACTATE, POTASSIUM CHLORIDE, CALCIUM CHLORIDE 600; 310; 30; 20 MG/100ML; MG/100ML; MG/100ML; MG/100ML
INJECTION, SOLUTION INTRAVENOUS CONTINUOUS
Status: DISCONTINUED | OUTPATIENT
Start: 2019-07-26 | End: 2019-07-26 | Stop reason: HOSPADM

## 2019-07-26 RX ORDER — SODIUM CHLORIDE, SODIUM LACTATE, POTASSIUM CHLORIDE, CALCIUM CHLORIDE 600; 310; 30; 20 MG/100ML; MG/100ML; MG/100ML; MG/100ML
INJECTION, SOLUTION INTRAVENOUS PRN
Status: CANCELLED | OUTPATIENT
Start: 2019-07-26

## 2019-07-26 RX ORDER — MISOPROSTOL 200 UG/1
600 TABLET ORAL
Status: CANCELLED | OUTPATIENT
Start: 2019-07-26

## 2019-07-26 RX ORDER — MORPHINE SULFATE 4 MG/ML
2 INJECTION, SOLUTION INTRAMUSCULAR; INTRAVENOUS
Status: DISCONTINUED | OUTPATIENT
Start: 2019-07-26 | End: 2019-07-26 | Stop reason: HOSPADM

## 2019-07-26 RX ORDER — DIPHENHYDRAMINE HYDROCHLORIDE 50 MG/ML
12.5 INJECTION INTRAMUSCULAR; INTRAVENOUS
Status: DISCONTINUED | OUTPATIENT
Start: 2019-07-26 | End: 2019-07-26 | Stop reason: HOSPADM

## 2019-07-26 RX ORDER — IBUPROFEN 600 MG/1
600 TABLET ORAL EVERY 6 HOURS PRN
Qty: 30 TAB | Refills: 0 | Status: SHIPPED | OUTPATIENT
Start: 2019-07-26 | End: 2019-07-26

## 2019-07-26 RX ORDER — METHYLERGONOVINE MALEATE 0.2 MG/ML
INJECTION INTRAVENOUS PRN
Status: DISCONTINUED | OUTPATIENT
Start: 2019-07-26 | End: 2019-07-26 | Stop reason: SURG

## 2019-07-26 RX ORDER — METHYLERGONOVINE MALEATE 0.2 MG/ML
INJECTION INTRAVENOUS
Status: DISCONTINUED | OUTPATIENT
Start: 2019-07-26 | End: 2019-07-26 | Stop reason: HOSPADM

## 2019-07-26 RX ORDER — PROMETHAZINE HYDROCHLORIDE 25 MG/1
25 SUPPOSITORY RECTAL EVERY 4 HOURS PRN
Status: DISCONTINUED | OUTPATIENT
Start: 2019-07-26 | End: 2019-07-26 | Stop reason: HOSPADM

## 2019-07-26 RX ORDER — SIMETHICONE 80 MG
80 TABLET,CHEWABLE ORAL EVERY 8 HOURS PRN
Status: DISCONTINUED | OUTPATIENT
Start: 2019-07-26 | End: 2019-07-26 | Stop reason: HOSPADM

## 2019-07-26 RX ORDER — HALOPERIDOL 5 MG/ML
1 INJECTION INTRAMUSCULAR
Status: DISCONTINUED | OUTPATIENT
Start: 2019-07-26 | End: 2019-07-26 | Stop reason: HOSPADM

## 2019-07-26 RX ADMIN — SODIUM CHLORIDE, POTASSIUM CHLORIDE, SODIUM LACTATE AND CALCIUM CHLORIDE: 600; 310; 30; 20 INJECTION, SOLUTION INTRAVENOUS at 17:16

## 2019-07-26 RX ADMIN — SCOPALAMINE 1 PATCH: 1 PATCH, EXTENDED RELEASE TRANSDERMAL at 16:16

## 2019-07-26 RX ADMIN — PROPOFOL 250 MG: 10 INJECTION, EMULSION INTRAVENOUS at 17:22

## 2019-07-26 RX ADMIN — FENTANYL CITRATE 50 MCG: 50 INJECTION, SOLUTION INTRAMUSCULAR; INTRAVENOUS at 17:48

## 2019-07-26 RX ADMIN — FENTANYL CITRATE 50 MCG: 0.05 INJECTION, SOLUTION INTRAMUSCULAR; INTRAVENOUS at 18:56

## 2019-07-26 RX ADMIN — FENTANYL CITRATE 25 MCG: 0.05 INJECTION, SOLUTION INTRAMUSCULAR; INTRAVENOUS at 18:46

## 2019-07-26 RX ADMIN — METHYLERGONOVINE MALEATE 0.2 MG: 0.2 INJECTION INTRAMUSCULAR; INTRAVENOUS at 17:53

## 2019-07-26 RX ADMIN — LIDOCAINE HYDROCHLORIDE 100 MG: 20 INJECTION, SOLUTION INTRAVENOUS at 17:22

## 2019-07-26 RX ADMIN — FENTANYL CITRATE 50 MCG: 50 INJECTION, SOLUTION INTRAMUSCULAR; INTRAVENOUS at 17:22

## 2019-07-26 RX ADMIN — HYDROCODONE BITARTRATE AND ACETAMINOPHEN 30 ML: 7.5; 325 SOLUTION ORAL at 18:27

## 2019-07-26 RX ADMIN — FENTANYL CITRATE 25 MCG: 0.05 INJECTION, SOLUTION INTRAMUSCULAR; INTRAVENOUS at 18:37

## 2019-07-26 RX ADMIN — DEXAMETHASONE SODIUM PHOSPHATE 4 MG: 4 INJECTION, SOLUTION INTRA-ARTICULAR; INTRALESIONAL; INTRAMUSCULAR; INTRAVENOUS; SOFT TISSUE at 17:27

## 2019-07-26 RX ADMIN — ONDANSETRON 4 MG: 2 INJECTION INTRAMUSCULAR; INTRAVENOUS at 18:00

## 2019-07-26 RX ADMIN — FENTANYL CITRATE 50 MCG: 50 INJECTION, SOLUTION INTRAMUSCULAR; INTRAVENOUS at 17:30

## 2019-07-26 RX ADMIN — MIDAZOLAM HYDROCHLORIDE 2 MG: 1 INJECTION, SOLUTION INTRAMUSCULAR; INTRAVENOUS at 17:10

## 2019-07-26 RX ADMIN — CEFAZOLIN 2 G: 330 INJECTION, POWDER, FOR SOLUTION INTRAMUSCULAR; INTRAVENOUS at 17:25

## 2019-07-26 RX ADMIN — KETOROLAC TROMETHAMINE 30 MG: 30 INJECTION, SOLUTION INTRAMUSCULAR at 18:00

## 2019-07-26 NOTE — ED PROVIDER NOTES
"ED Provider Note    CHIEF COMPLAINT  Chief Complaint   Patient presents with   • Spotting   • Abdominal Cramping         HPI  Christal Roblero is a 39 y.o. female who presents with spotting.  Patient is currently 12 weeks pregnant.   with 1 previous elective .  Started having spotting 3 days ago.  Blood on day.  Mild associated lower pelvic cramping.  No abdominal pain.  No fever, nausea, vomiting.  Feels some cramping when she urinates but no remarkable dysuria.  No flank pain.    REVIEW OF SYSTEMS  As above  All other systems are negative.     PAST MEDICAL HISTORY  Past Medical History:   Diagnosis Date   • Allergy     allergic to bananas, avocadoes, and most fruits   • Anxiety    • Gout    • Headache(784.0)    • Migraine        FAMILY HISTORY  Family History   Problem Relation Age of Onset   • Heart Disease Mother         irregular heartbeat   • Thyroid Sister         hypo       SOCIAL HISTORY  Social History   Substance Use Topics   • Smoking status: Never Smoker   • Smokeless tobacco: Never Used   • Alcohol use Yes      Comment: rare; on occasion        SURGICAL HISTORY  No past surgical history on file.    CURRENT MEDICATIONS  Home Medications     Reviewed by Leodan Murdock R.N. (Registered Nurse) on 19 at 1058  Med List Status: Not Addressed   Medication Last Dose Status        Patient Wiley Taking any Medications                       ALLERGIES  Allergies   Allergen Reactions   • Wheeler (Diagnostic) Itching     Throat Itchiness   • Nkda [No Known Drug Allergy]        PHYSICAL EXAM  VITAL SIGNS: /72   Pulse (!) 106   Temp 37 °C (98.6 °F) (Temporal)   Resp 16   Ht 1.676 m (5' 6\")   Wt 91.2 kg (201 lb 1 oz)   SpO2 98%   BMI 32.45 kg/m²   Constitutional: Awake and alert  HENT: Moist  Eyes: Sclera white  Neck: Normal range of motion  Cardiovascular: Normal heart rate, Normal rhythm  Thorax & Lungs: Normal breath sounds, No respiratory distress, No wheezing, No chest " tenderness.   Abdomen: Soft, nondistended, nontender.  No rebound or peritonitis  Skin: No rash.   Back: No tenderness, No CVA tenderness.   Extremities: Intact, symmetric distal pulses, no edema.  Neurologic: Grossly normal    RADIOLOGY/PROCEDURES  US-OB TRANSVAGINAL ONLY   Final Result      Single IUP with estimated gestational age 10 weeks 0 days. No cardiac activity identified.         Imaging is interpreted by radiologist    Labs:   Results for orders placed or performed during the hospital encounter of 07/26/19   CBC WITH DIFFERENTIAL   Result Value Ref Range    WBC 9.1 4.8 - 10.8 K/uL    RBC 5.08 4.20 - 5.40 M/uL    Hemoglobin 14.5 12.0 - 16.0 g/dL    Hematocrit 45.1 37.0 - 47.0 %    MCV 88.8 81.4 - 97.8 fL    MCH 28.5 27.0 - 33.0 pg    MCHC 32.2 (L) 33.6 - 35.0 g/dL    RDW 43.2 35.9 - 50.0 fL    Platelet Count 284 164 - 446 K/uL    MPV 9.1 9.0 - 12.9 fL    Neutrophils-Polys 61.80 44.00 - 72.00 %    Lymphocytes 29.40 22.00 - 41.00 %    Monocytes 7.40 0.00 - 13.40 %    Eosinophils 0.70 0.00 - 6.90 %    Basophils 0.40 0.00 - 1.80 %    Immature Granulocytes 0.30 0.00 - 0.90 %    Nucleated RBC 0.00 /100 WBC    Neutrophils (Absolute) 5.60 2.00 - 7.15 K/uL    Lymphs (Absolute) 2.66 1.00 - 4.80 K/uL    Monos (Absolute) 0.67 0.00 - 0.85 K/uL    Eos (Absolute) 0.06 0.00 - 0.51 K/uL    Baso (Absolute) 0.04 0.00 - 0.12 K/uL    Immature Granulocytes (abs) 0.03 0.00 - 0.11 K/uL    NRBC (Absolute) 0.00 K/uL   HCG QUANTITATIVE SERUM   Result Value Ref Range    Bhcg 3731.0 (H) 0.0 - 5.0 mIU/mL       COURSE & MEDICAL DECISION MAKING  Patient presents with first trimester vaginal bleeding.  Work-up was initiated.  Denied heavy bleeding.  She is Rh+.  Pelvic ultrasound demonstrates IUP without cardiac activity.    Page Dr. Payne.  Would not take the call.  Advised to call primary OB/GYN, Dr. Garcia.    Discussed with Dr. Garcia.  She will see the patient in the emergency department.    FINAL IMPRESSION  1.   Miscarriage        This dictation was created using voice recognition software. The accuracy of the dictation is limited to the abilities of the software.  The nursing notes were reviewed and certain aspects of this information were incorporated into this note.    Electronically signed by: Edenilson Clayton, 7/26/2019 12:12 PM

## 2019-07-26 NOTE — ANESTHESIA PREPROCEDURE EVALUATION
"38 y/o female who was pregnant at 12 weeks presented today with cramping and bleeding - found to have SAB, on ultrasound - no heart tones, now here for D&C. Previous anesthesia - patient said she had awareness with back surgery and she \"takes more anesthesia than normal\". She had some bread at 8 am.    Relevant Problems   (+) History of unintended awareness under general anesthesia   (+) Low back pain   (+) Obesity (BMI 35.0-39.9 without comorbidity)      Pertinent Negatives   (-) Difficult intubation   (-) HTN (hypertension)   (-) Malignant hyperthermia   (-) Recent URI   (-) Shortness of breath     Lab Results   Component Value Date/Time    WBC 9.1 07/26/2019 12:21 PM    WBC 8.0 11/18/2010 06:27 PM    RBC 5.08 07/26/2019 12:21 PM    RBC 4.91 11/18/2010 06:27 PM    HEMOGLOBIN 14.5 07/26/2019 12:21 PM    HEMATOCRIT 45.1 07/26/2019 12:21 PM    MCV 88.8 07/26/2019 12:21 PM    MCV 87 11/18/2010 06:27 PM    MCH 28.5 07/26/2019 12:21 PM    MCH 29.3 11/18/2010 06:27 PM    MCHC 32.2 (L) 07/26/2019 12:21 PM    MPV 9.1 07/26/2019 12:21 PM    NEUTSPOLYS 61.80 07/26/2019 12:21 PM    LYMPHOCYTES 29.40 07/26/2019 12:21 PM    MONOCYTES 7.40 07/26/2019 12:21 PM    EOSINOPHILS 0.70 07/26/2019 12:21 PM    BASOPHILS 0.40 07/26/2019 12:21 PM    HYPOCHROMIA 1+ 05/20/2011 09:16 AM        Physical Exam    Airway   Mallampati: II  TM distance: >3 FB  Neck ROM: full       Cardiovascular - normal exam  Rhythm: regular  Rate: normal     Dental - normal exam         Pulmonary   Breath sounds clear to auscultation     Abdominal    Neurological - normal exam                 Anesthesia Plan    ASA 2       Plan - general       Airway plan will be LMA        Induction: intravenous    Postoperative Plan: Postoperative administration of opioids is intended.    Pertinent diagnostic labs and testing reviewed    Informed Consent:    Anesthetic plan and risks discussed with patient.        "

## 2019-07-27 LAB — PATHOLOGY CONSULT NOTE: NORMAL

## 2019-07-27 NOTE — OP REPORT
DATE OF SERVICE:  2019    PREOPERATIVE DIAGNOSES:  1.  Missed .  2.  Advanced maternal age.  3.  Abnormal chromosomes on noninvasive prenatal testing.    POSTOPERATIVE DIAGNOSES:  1.  Missed .  2.  Advanced maternal age.  3.  Abnormal chromosomes on noninvasive prenatal testing.    PROCEDURE PERFORMED:  Suction dilation and curettage, first trimester.    SURGEON:  Sarah Garcia MD    ANESTHESIOLOGIST:  Laurita Elam MD    ANESTHESIA:  General.    COMPLICATIONS:  None.    ESTIMATED BLOOD LOSS:  300 mL    FINDINGS:  Cervix was dilated up to 9 mm.  Size 8 mm curved suction curette   was used followed by sharp curetting.  Products of conception were seen and   obtained and will be sent for pathology.  Bleeding minimal.    COMPLICATIONS:  None.    DISPOSITION:  To recovery room stable.    DESCRIPTION OF PROCEDURE:  The patient was taken to the operating room where   Dr. Elam placed her under general anesthesia without any difficulty.  She   was prepped and draped in normal sterile fashion in dorsal lithotomy position.    Time-out was called to identify correct patient, correct procedure and   confirmed she has gotten preoperative antibiotics.  Straight catheterization   was done and sent for urine culture secondary to having little white blood   cells on clean catch.  Weighted speculum was placed in the vagina and the   anterior lip of the cervix was grasped with a single tooth tenaculum.  Cervix   was then serially dilated up to 9 mm.  A size 8 mm curved suction curette was   then introduced and suction was turned on.  Several passes were made.    Products of conception were seen flowing through the tubing as well as at the   os, which were teased out with forceps and will be sent with the rest of the   products of conception.  Following that, sharp curetting around the   endometrial lining until there was uniform grittiness texture   circumferentially.  Once the bleeding stopped, suction  was then introduced   once again.  The single tooth tenaculum was then removed.  Silver nitrate was   applied to the tenaculum sites.  Once hemostasis was assured, the speculum was   then removed.  The patient was taken out of lithotomy position, extubated and   taken to recovery room stable.       ____________________________________     MD DAVIS VIERA / SHAYLA    DD:  07/26/2019 18:08:26  DT:  07/26/2019 18:25:22    D#:  [id number]  Job#:  [id number]

## 2019-07-27 NOTE — ANESTHESIA TIME REPORT
Anesthesia Start and Stop Event Times     Date Time Event    2019 1716 Anesthesia Start     181 Anesthesia Stop        Responsible Staff  19    Name Role Begin End    Laurita BILLY Elam Anesth 1716 181        Preop Diagnosis (Free Text):  Pre-op Diagnosis     MISSED         Preop Diagnosis (Codes):  Diagnosis Information     Diagnosis Code(s):         Post op Diagnosis  Missed       Premium Reason  A. 3PM - 7AM    Comments:

## 2019-07-27 NOTE — ANESTHESIA POSTPROCEDURE EVALUATION
Patient: Christal Roblero    Procedure Summary     Date:  19 Room / Location:  [place] [address] / SURGERY [facility]    Anesthesia Start:   Anesthesia Stop:      Procedure:  DILATION AND EVACUATION 1ST TRIMESTER (N/A Uterus) Diagnosis:  (MISSED )    Surgeon:  Sarah Garcia M.D. Responsible Provider:  Laurita Elam    Anesthesia Type:  general ASA Status:  2          Final Anesthesia Type: general  Last vitals  BP   Blood Pressure: 113/78    Temp   36.4 °C (97.6 °F)    Pulse   Pulse: 85   Resp   16    SpO2   98 %      Anesthesia Post Evaluation    Patient location during evaluation: PACU  Patient participation: complete - patient participated  Level of consciousness: awake and alert    Airway patency: patent  Anesthetic complications: no  Cardiovascular status: hemodynamically stable  Respiratory status: acceptable  Hydration status: euvolemic    PONV: none           Nurse Pain Score: 3 (NPRS)

## 2019-07-27 NOTE — ANESTHESIA QCDR
2019 [facility] Clinical Data Registry (for Quality Improvement)     Postoperative nausea/vomiting risk protocol (Adult = 18 yrs and Pediatric 3-17 yrs)- (430 and 463)  General inhalation anesthetic (NOT TIVA) with PONV risk factors: Yes  Provision of anti-emetic therapy with at least 2 different classes of agents: Yes   Patient DID NOT receive anti-emetic therapy and reason is documented in Medical Record:  N/A    Multimodal Pain Management- (AQI59)  Patient undergoing Elective Surgery (i.e. Outpatient, or ASC, or Prescheduled Surgery prior to Hospital Admission): No  Use of Multimodal Pain Management, two or more drugs and/or interventions, NOT including systemic opioids: N/A  Exception: Documented allergy to multiple classes of analgesics: N/A    PACU assessment of acute postoperative pain prior to Anesthesia Care End- Applies to Patients Age = 18- (ABG7)  Initial PACU pain score is which of the following: < 7/10  Patient unable to report pain score: N/A    Post-anesthetic transfer of care checklist/protocol to PACU/ICU- (426 and 427)  Upon conclusion of case, patient transferred to which of the following locations: PACU/Non-ICU  Use of transfer checklist/protocol: Yes  Exclusion: Service Performed in Patient Hospital Room (and thus did not require transfer): N/A    PACU Reintubation- (AQI31)  General anesthesia requiring endotracheal intubation (ETT) along with subsequent extubation in OR or PACU: No  Required reintubation in the PACU: N/A  Extubation was a planned trial documented in the medical record prior to removal of the original airway device: N/A    Unplanned admission to ICU related to anesthesia service up through end of PACU care- (MD51)  Unplanned admission to ICU (not initially anticipated at anesthesia start time): No

## 2019-07-27 NOTE — ANESTHESIA PROCEDURE NOTES
Airway  Date/Time: 7/26/2019 5:23 PM  Performed by: Laurita Elam  Authorized by: Laurita Elam     Location:  OR  Urgency:  Elective  Indications for Airway Management:  Anesthesia  Spontaneous Ventilation: absent    Sedation Level:  Deep  Preoxygenated: Yes    Mask Difficulty Assessment:  1 - vent by mask  Final Airway Type:  Supraglottic airway  Final Supraglottic Airway:  Standard LMA  SGA Size:  4  Number of Attempts at Approach:  1

## 2019-07-27 NOTE — PROGRESS NOTES
"Pt arrived to floor from PACU. Pt A+Ox4, able to make needs known, PIV Patent and SL. Pain 3/10, \"crampy\".   CSMT's and LINETTE's WNL.     Pt's VSS; denies N/V; states pain is 3/10 but tolerable level. D/c orders received. IV dc'd. Pt changed into clothing with assistance. Discharge instructions given; pt and family verbalized understanding and questions answered. Patient states ready to d/c home. Prescriptions given. Pt  Completed phase 2.  "

## 2019-07-27 NOTE — H&P
DATE OF SERVICE:  2019    HISTORY OF PRESENT ILLNESS:  The patient is a 39-year-old  4, para 2 at   12-5/7th weeks gestation, presents to the ER this afternoon complaining of   spotting.  The patient has been seen by Dr. Ruiz's office earlier this week   secondary to abnormal screening.  The patient went in for a first trimester   screening and it did show evidence of Down syndrome.  The patient was getting   ready to have amniocentesis for confirmation in his office later in the next   coming weeks.  However, upon arrival today, patient had an ultrasound done and   showed no fetal cardiac activity present.  Crown-rump length today is   measuring 10 weeks even.  She has a small 1.1x1.5 cm right ovarian cyst.    Discussion with the patient at the bedside, she would like to proceed with a   suction dilation and curettage at this time.  She understands all her other   options including expectant management versus Cytotec.  She desires to proceed   with a dilation and curettage as soon as possible.    PAST MEDICAL HISTORY:  Significant for obesity and migraine headaches.    PAST SURGICAL HISTORY:  None.    CURRENT MEDICINES:  Include only a prenatal vitamin.    ALLERGIES:  No known drug allergies, but allergies to lots of food.    FAMILY HISTORY:  Noncontributory.    OBSTETRICAL HISTORY:  She has had 2 vaginal deliveries, both 7 pounds.  She   has had 1 elective .    SOCIAL HISTORY:  Denies alcohol, tobacco, history of drug use.    GYNECOLOGIC HISTORY:  Denies history of any STDs.  She does have a history of   abnormal Paps.  Her last one was normal though.    PHYSICAL EXAMINATION:  VITAL SIGNS:  Stable.  She is afebrile.  GENERAL:  She is awake, alert and oriented.  She is appropriate demeanor for   the news given to her today.  CARDIOVASCULAR:  Regular rate and rhythm.  CHEST:  Clear to auscultation bilaterally.  ABDOMEN:  Obese, soft, nontender, nondistended.  Normal bowel sounds.  EXTREMITIES:   No cyanosis, clubbing or edema.  PELVIC:  Sterile vaginal exam per ER report, she is having scant bleeding from   the os.  No adnexal tenderness or cervical motion tenderness.    LABORATORY DATA:  Hemoglobin 14.5, hematocrit 45.1, platelet count 284,000.    Urinalysis showed 20-50 white blood cells, 0-2 red blood cells, moderate   epithelial cells though and many bacteria.  Beta-hCG 3731.  She is B positive,   rubella is nonimmune, RPR nonreactive, hepatitis B surface antigen negative.    HIV is nonreactive.  Varicella titer was immune.  Ultrasound findings again   no fetal cardiac activity.  Crown-rump length 10 weeks even today.    ASSESSMENT AND PLAN:  A 39-year-old  4, para 2 who was to be 12-5/7   weeks' gestation today with no fetal cardiac activity present on ultrasound in   the ER.  Number one missed .  Risks, benefits and alternatives have   been thoroughly discussed with her at the bedside.  Risks for expectant   management as well as surgical management has been thoroughly discussed with   her as well as medical management.  The patient desires to proceed with   surgical management.  Risks, benefits and alternatives of a suction dilation   and curettage discussed including risks of bleeding, infection, pain, injury   to the uterus itself and the scar tissue development of a possibility as well   as not completing the miscarriage completely.  She does accept these risks and   still wants to proceed.  She has only had water since about 7:30 this   morning.  She will be n.p.o. from here on out.  All questions were answered   and consents will be signed at the bedside.  The baby has a probable Down   syndrome.  She declined wanting to have chromosome testing done on the   products of conception at this time.       ____________________________________     MD DAVIS VIERA / SHAYLA    DD:  2019 15:36:36  DT:  2019 17:07:11    D#:  [id number]  Job#:  [id number]

## 2019-07-27 NOTE — OR NURSING
Patient awake alert and oriented. Vital signs stable.  Patient reports pain improvement and is tolerable. No complaints of nausea.  Friend at bedside for emotional support.  No issues while in PACU.

## 2019-07-27 NOTE — OR SURGEON
Immediate Post OP Note    PreOp Diagnosis: Missed     PostOp Diagnosis: same    Procedure(s): Suction  DILATION AND EVACUATION 1ST TRIMESTER - Wound Class: Clean Contaminated    Surgeon(s):  Sarah Garcia M.D.    Anesthesiologist/Type of Anesthesia:  Anesthesiologist: Laurita Elam/General    Surgical Staff:  Circulator: Teresa Rebollar; Carlie Parrish R.N.  Relief Scrub: Lillian Cronin  Scrub Person: Mike Ruiz    Specimens removed if any:  ID Type Source Tests Collected by Time Destination   A : PRODUCTS OF CONCEPTION Tissue Uterus PATHOLOGY SPECIMEN Sarah Garcia M.D. 2019  5:49 PM        Estimated Blood Loss: 300cc  Findings: Cervix dilated up to 9mm, size 8mm curved suction currette used followed by sharp curette. POC obtained and will be sent for path.     Complications: none  Dispo: to RR stable.         2019 6:04 PM Sarah Garcia M.D.

## 2019-07-27 NOTE — DISCHARGE INSTRUCTIONS
ACTIVITY: Rest and take it easy for the first 24 hours.  A responsible adult is recommended to remain with you during that time.  It is normal to feel sleepy.  We encourage you to not do anything that requires balance, judgment or coordination.    MILD FLU-LIKE SYMPTOMS ARE NORMAL. YOU MAY EXPERIENCE GENERALIZED MUSCLE ACHES, THROAT IRRITATION, HEADACHE AND/OR SOME NAUSEA.    FOR 24 HOURS DO NOT:  Drive, operate machinery or run household appliances.  Drink beer or alcoholic beverages.   Make important decisions or sign legal documents.    SPECIAL INSTRUCTIONS:   When meets discharge criteria  Pelvic rest x 4 weeks  Follow up with Dr Garcia in 2 weeks - call for appt    DIET: To avoid nausea, slowly advance diet as tolerated, avoiding spicy or greasy foods for the first day.  Add more substantial food to your diet according to your physician's instructions.  Babies can be fed formula or breast milk as soon as they are hungry.  INCREASE FLUIDS AND FIBER TO AVOID CONSTIPATION.    SURGICAL DRESSING/BATHING: May shower. No hot tubs, tub baths or pools until cleared by MD. Nothing in the vagina; no douching, tampons, or intercourse until cleared by MD.    FOLLOW-UP APPOINTMENT:  A follow-up appointment should be arranged with your doctor in 1-2 weeks; call to schedule.    You should CALL YOUR PHYSICIAN if you develop:  Fever greater than 101 degrees F.  Pain not relieved by medication, or persistent nausea or vomiting.  Excessive bleeding (blood soaking through dressing) or unexpected drainage from the wound.  Extreme redness or swelling around the incision site, drainage of pus or foul smelling drainage.  Inability to urinate or empty your bladder within 8 hours.  Problems with breathing or chest pain.    You should call 911 if you develop problems with breathing or chest pain.  If you are unable to contact your doctor or surgical center, you should go to the nearest emergency room or urgent care center.  Physician's  telephone #: [phone] Dr Garcia    If any questions arise, call your doctor.  If your doctor is not available, please feel free to call the Surgical Center at [phone].  The Center is open Monday through Friday from 7AM to 7PM.  You can also call the HEALTH HOTLINE open 24 hours/day, 7 days/week and speak to a nurse at [phone], or toll free at [phone].    A registered nurse may call you a few days after your surgery to see how you are doing after your procedure.    MEDICATIONS: Resume taking daily medication.  Take prescribed pain medication with food.  If no medication is prescribed, you may take non-aspirin pain medication if needed.  PAIN MEDICATION CAN BE VERY CONSTIPATING.  Take a stool softener or laxative such as senokot, pericolace, or milk of magnesia if needed.    Prescription given for Motrin.  Last pain medication given at 6:27pm (hycet).    If your physician has prescribed pain medication that includes Acetaminophen (Tylenol), do not take additional Acetaminophen (Tylenol) while taking the prescribed medication.    Depression / Suicide Risk    As you are discharged from this [facility] facility, it is important to learn how to keep safe from harming yourself.    Recognize the warning signs:  · Abrupt changes in personality, positive or negative- including increase in energy   · Giving away possessions  · Change in eating patterns- significant weight changes-  positive or negative  · Change in sleeping patterns- unable to sleep or sleeping all the time   · Unwillingness or inability to communicate  · Depression  · Unusual sadness, discouragement and loneliness  · Talk of wanting to die  · Neglect of personal appearance   · Rebelliousness- reckless behavior  · Withdrawal from people/activities they love  · Confusion- inability to concentrate     If you or a loved one observes any of these behaviors or has concerns about self-harm, here's what you can do:  · Talk about it- your  feelings and reasons for harming yourself  · Remove any means that you might use to hurt yourself (examples: pills, rope, extension cords, firearm)  · Get professional help from the community (Mental Health, Substance Abuse, psychological counseling)  · Do not be alone:Call your Safe Contact- someone whom you trust who will be there for you.  · Call your local CRISIS HOTLINE 031-8885 or 708-714-5379  · Call your local Children's Mobile Crisis Response Team [place] (669) 133-7151 or www.Mailcloud  · Call the toll free National Suicide Prevention Hotlines   · National Suicide Prevention Lifeline 104-695-OERZ (8905)  · National Hope Line Network 800-SUICIDE (205-1707)

## 2019-07-28 LAB
BACTERIA UR CULT: NORMAL
SIGNIFICANT IND 70042: NORMAL
SITE SITE: NORMAL
SOURCE SOURCE: NORMAL

## 2019-07-29 LAB
BACTERIA UR CULT: NORMAL
SIGNIFICANT IND 70042: NORMAL
SITE SITE: NORMAL
SOURCE SOURCE: NORMAL

## 2019-07-30 ENCOUNTER — OFFICE VISIT (OUTPATIENT)
Dept: MEDICAL GROUP | Facility: LAB | Age: 39
End: 2019-07-30
Payer: COMMERCIAL

## 2019-07-30 VITALS
TEMPERATURE: 97 F | BODY MASS INDEX: 38.68 KG/M2 | WEIGHT: 197 LBS | OXYGEN SATURATION: 96 % | RESPIRATION RATE: 12 BRPM | SYSTOLIC BLOOD PRESSURE: 98 MMHG | HEART RATE: 74 BPM | DIASTOLIC BLOOD PRESSURE: 76 MMHG | HEIGHT: 60 IN

## 2019-07-30 DIAGNOSIS — J03.90 ACUTE TONSILLITIS, UNSPECIFIED ETIOLOGY: ICD-10-CM

## 2019-07-30 DIAGNOSIS — O02.1 MISSED ABORTION: ICD-10-CM

## 2019-07-30 PROCEDURE — 99214 OFFICE O/P EST MOD 30 MIN: CPT | Performed by: NURSE PRACTITIONER

## 2019-07-30 RX ORDER — MEDROXYPROGESTERONE ACETATE 150 MG/ML
150 INJECTION, SUSPENSION INTRAMUSCULAR ONCE
Status: DISCONTINUED | OUTPATIENT
Start: 2019-07-30 | End: 2019-07-30

## 2019-07-30 RX ORDER — CLINDAMYCIN HYDROCHLORIDE 300 MG/1
300 CAPSULE ORAL 3 TIMES DAILY
Qty: 21 CAP | Refills: 0 | Status: SHIPPED | OUTPATIENT
Start: 2019-07-30 | End: 2019-08-06

## 2019-07-30 RX ORDER — PROMETHAZINE HYDROCHLORIDE AND CODEINE PHOSPHATE 6.25; 1 MG/5ML; MG/5ML
5-10 SYRUP ORAL 3 TIMES DAILY PRN
Qty: 210 ML | Refills: 0 | Status: SHIPPED
Start: 2019-07-30 | End: 2019-08-06

## 2019-07-30 NOTE — PROGRESS NOTES
Chief Complaint   Patient presents with   • Pharyngitis     X 2 days       HPI  Christal is a 39-year-old established female here with a couple of issues:  1.-  New onset sore throat, swollen tonsils and not feeling well for the past 72 hours.  Symptoms are worsening.  She has a lot of pain with swallowing.  Positive low-grade fevers.  Denies cough or ear pain.  Has also had sinus pressure/headache.  Mucus is yellow.  No other associated symptoms.  Not taking anything for her pain.  Positive history of tonsillitis over the past few years.  2-miscarriage: Unfortunately she started spotting several days ago, went to the emergency department and ultrasound showed lack of fetal heart rate at 12 weeks of gestation.  Had a D&C with Dr. Garcia on the 26th and is no longer bleeding.  She had also found out that her baby had Down syndrome and cystic fibrosis several days prior.  She is feeling tearful/sad.  Denies suicidal or homicidal ideations.  Has a good support group at home.  3-contraception: She would like to go back on Depo-Provera.  Without Depo-Provera she has extremely heavy menstrual periods.  Would also like an IUD placed as soon as possible.  Sexually active with one partner.      Past medical, surgical, family, and social history is reviewed and updated in Epic chart by me today.   Medications and allergies reviewed and updated in Epic chart by me today.     ROS:   As documented in history of present illness above    Exam:  BP (!) 98/76 (BP Location: Left arm, Patient Position: Sitting, BP Cuff Size: Large adult)   Pulse 74   Temp 36.1 °C (97 °F)   Resp 12   Ht 1.524 m (5')   Wt 89.4 kg (197 lb)   SpO2 96%   Constitutional: Alert, no distress, plus 3 vital signs  Skin:  Warm, dry, no rashes invisible areas  Eye: Equal, round and reactive, conjunctiva clear  ENMT: Lips without lesions, good dentition. + enlarged tonsils +2 covered in exudate.  TM translucent bilaterally.   Respiratory: Unlabored respiration,  lungs clear to auscultation, no wheezes, no rhonchi  Cardiovascular: Normal rate and rhythm  Psych: Alert, pleasant, well-groomed, normal affect    Assessment / Plan / Medical Decision makin. Acute tonsillitis, unspecified etiology   encouraged her to start antibiotics ASAP.  She was given a codeine-containing syrup for comfort.  Discussed appropriate amounts of ibuprofen, high water intake, rest and contagiousness precautions.  I did encourage her to call 911 or go to the emergency department if she begins to have worsening throat swelling that causes difficulty breathing.  Clindamycin (CLEOCIN) 300 MG Cap    promethazine-codeine (PHENERGAN-CODEINE) 6.25-10 MG/5ML Syrup   2. Missed    reviewed patient's emergency department visit with her as well as OR from Dr. Garcia.  She is certainly going through an emotional time.  Denies suicidal or homicidal ideations.  Offered a referral to a grief therapist which she will consider.  I will see her back next week to see how she is doing.  She does tell me that she has a good support group.  She tells me that she really did not appreciate the office staff at [facility] and would like to change to [facility], she was given their phone number as well as medical provider suggestions, considering that she has had an abnormal Pap smear needs to follow-up regarding this.   3. Contraception   we do have her IUD in stock that was ordered prior to her becoming pregnant.  She was rescheduled to have this placed next week and Depo-Provera was not given today.

## 2019-07-31 NOTE — ADDENDUM NOTE
Addendum  created 07/31/19 1327 by Laurita Elam M.D.    Intraprocedure Blocks edited, Sign clinical note

## 2019-08-05 ENCOUNTER — OFFICE VISIT (OUTPATIENT)
Dept: MEDICAL GROUP | Facility: LAB | Age: 39
End: 2019-08-05
Payer: COMMERCIAL

## 2019-08-05 VITALS
SYSTOLIC BLOOD PRESSURE: 116 MMHG | RESPIRATION RATE: 12 BRPM | TEMPERATURE: 98.6 F | HEART RATE: 88 BPM | BODY MASS INDEX: 38.28 KG/M2 | WEIGHT: 195 LBS | OXYGEN SATURATION: 96 % | HEIGHT: 60 IN | DIASTOLIC BLOOD PRESSURE: 64 MMHG

## 2019-08-05 DIAGNOSIS — Z30.430 ENCOUNTER FOR IUD INSERTION: ICD-10-CM

## 2019-08-05 PROBLEM — Z97.5 IUD (INTRAUTERINE DEVICE) IN PLACE: Status: ACTIVE | Noted: 2019-08-05

## 2019-08-05 PROCEDURE — 58300 INSERT INTRAUTERINE DEVICE: CPT | Performed by: NURSE PRACTITIONER

## 2019-08-05 NOTE — PROGRESS NOTES
Chief Complaint   Patient presents with   • Contraception     HPI:  Christal is a 39-year-old established female here with request for IUD placement.  Unfortunately she had a D&C on 26 July because of miscarriage at 12 weeks gestation.  Has not been sexually active since.  Previously received Depo-Provera shots and prefers an IUD.    Today the patient is counseled on the risks of IUD insertion. Specifically discussed were alternative forms of birth control. I also discussed with the patient the risk of infection on insertion, and had asked the patient to remain on pelvic rest for one week following the insertion. We also discussed the risk of IUD expulsion, the risk of uterine perforation and IUD migration. If the IUD does migrate the patient may require a separate procedure such as a laparoscopy to retrieve the migrated IUD. I also discussed the 1% risk of pregnancy with IUD use. I also discussed the side effects of Mirena which can be amenorrhea or dysfunctional uterine bleeding or spotting.  Patient had the opportunity to ask questions regarding insertion, risks and benefits, all questions are answered in their entirety.  Informed consent is signed    /64 (BP Location: Left arm, Patient Position: Sitting, BP Cuff Size: Adult)   Pulse 88   Temp 37 °C (98.6 °F)   Resp 12   Ht 1.524 m (5')   Wt 88.5 kg (195 lb)   SpO2 96%        Procedure note  The bimanual exam is performed the uterus is noted to be .9 weeks in size and is mid position  A speculum was inserted into the vagina, the cervix was cleansed with Betadine swabs x3  Tenaculum was not necessary.  The uterus was sounded to .8 centimeters  The IUD is placed under sterile conditions, Mirena  The strings trimmed to approximately 3 cm  The patient tolerated the procedure well    Lot #[id number]  Exp date 11/2021  Patient is asked to followup in 2-4 weeks for IUD check. The patient is asked to remain on pelvic rest for one week. She is asked to return  sooner than 2 weeks for heavy vaginal bleeding uncontrolled pain or fever

## 2020-02-03 ENCOUNTER — OFFICE VISIT (OUTPATIENT)
Dept: MEDICAL GROUP | Facility: LAB | Age: 40
End: 2020-02-03
Payer: COMMERCIAL

## 2020-02-03 ENCOUNTER — HOSPITAL ENCOUNTER (OUTPATIENT)
Dept: LAB | Facility: MEDICAL CENTER | Age: 40
End: 2020-02-03
Attending: NURSE PRACTITIONER
Payer: COMMERCIAL

## 2020-02-03 VITALS
SYSTOLIC BLOOD PRESSURE: 118 MMHG | HEIGHT: 60 IN | RESPIRATION RATE: 16 BRPM | OXYGEN SATURATION: 95 % | DIASTOLIC BLOOD PRESSURE: 74 MMHG | TEMPERATURE: 98.4 F | HEART RATE: 88 BPM | WEIGHT: 212 LBS | BODY MASS INDEX: 41.62 KG/M2

## 2020-02-03 DIAGNOSIS — F41.1 GENERALIZED ANXIETY DISORDER: ICD-10-CM

## 2020-02-03 LAB
T4 FREE SERPL-MCNC: 0.75 NG/DL (ref 0.53–1.43)
TSH SERPL DL<=0.005 MIU/L-ACNC: 1.54 UIU/ML (ref 0.38–5.33)

## 2020-02-03 PROCEDURE — 99213 OFFICE O/P EST LOW 20 MIN: CPT | Performed by: NURSE PRACTITIONER

## 2020-02-03 PROCEDURE — 84439 ASSAY OF FREE THYROXINE: CPT

## 2020-02-03 PROCEDURE — 36415 COLL VENOUS BLD VENIPUNCTURE: CPT

## 2020-02-03 PROCEDURE — 84443 ASSAY THYROID STIM HORMONE: CPT

## 2020-02-03 RX ORDER — ALPRAZOLAM 0.5 MG/1
.25-.5 TABLET ORAL 2 TIMES DAILY PRN
Qty: 20 TAB | Refills: 0 | Status: SHIPPED
Start: 2020-02-03 | End: 2020-10-29 | Stop reason: SDUPTHER

## 2020-02-03 RX ORDER — VENLAFAXINE HYDROCHLORIDE 75 MG/1
75 CAPSULE, EXTENDED RELEASE ORAL DAILY
Qty: 45 CAP | Refills: 1 | Status: SHIPPED | OUTPATIENT
Start: 2020-02-03 | End: 2020-02-24 | Stop reason: SDUPTHER

## 2020-02-03 ASSESSMENT — PATIENT HEALTH QUESTIONNAIRE - PHQ9: CLINICAL INTERPRETATION OF PHQ2 SCORE: 0

## 2020-02-03 NOTE — LETTER
[facility]  Meera Hightower A.P.N.  [id number]  [place] [address]  Fax: [phone]   Authorization for Release/Disclosure of   Protected Health Information   Name: CHRISTAL MCINTOSH : 1980 SSN: [id number]   Address: [address] [place] [id number] Phone:    [phone] (home) [phone] (work)   I authorize the entity listed below to release/disclose the PHI below to:   [facility]/Meera Hightower, A.P.N. and Meera Hightower A.P.N.   Provider or Entity Name:  Dr Sarah rockwell   Address   City, [facility], [facility]   Phone:      Fax:     Reason for request: continuity of care   Information to be released:    [  ] LAST COLONOSCOPY,  including any PATH REPORT and follow-up  [  ] LAST FIT/COLOGUARD RESULT [  ] LAST DEXA  [  ] LAST MAMMOGRAM  [ x ] LAST PAP  [  ] LAST LABS [  ] RETINA EXAM REPORT  [  ] IMMUNIZATION RECORDS  [  ] Release all info      [  ] Check here and initial the line next to each item to release ALL health information INCLUDING  _____ Care and treatment for drug and / or alcohol abuse  _____ HIV testing, infection status, or AIDS  _____ Genetic Testing    DATES OF SERVICE OR TIME PERIOD TO BE DISCLOSED: _____________  I understand and acknowledge that:  * This Authorization may be revoked at any time by you in writing, except if your health information has already been used or disclosed.  * Your health information that will be used or disclosed as a result of you signing this authorization could be re-disclosed by the recipient. If this occurs, your re-disclosed health information may no longer be protected by State or Federal laws.  * You may refuse to sign this Authorization. Your refusal will not affect your ability to obtain treatment.  * This Authorization becomes effective upon signing and will  on (date) __________.      If no date is indicated, this Authorization will  one (1) year from the signature date.    Name: Christal  Damien Roblero    Signature:   Date:     2/3/2020       PLEASE FAX REQUESTED RECORDS BACK TO: [phone]

## 2020-02-03 NOTE — PROGRESS NOTES
"Chief Complaint   Patient presents with   • Anxiety     med refill       SHU Siegel is a 39-year-old established female here with request to go back on medication for generalized anxiety disorder.  She was previously taking venlafaxine which worked well for her and she is requesting a renewed prescription.  She is also used alprazolam in the past as needed for anxiety attacks and would like a short prescription of this.  Unfortunately she had a miscarriage last July, her due date would be approximately now and she is feeling very sad about this.  She describes feeling her heart race, chest heaviness, sweating and shaky - \" out of the blue,\" numerous times throughout the day and in the evening.  Having trouble sleeping and focusing.  Appetite is lower than normal.  Very easily tearful.  Denies suicidal or homicidal ideations.      Past medical, surgical, family, and social history is reviewed and updated in Epic chart by me today.   Medications and allergies reviewed and updated in Epic chart by me today.     ROS:   As documented in history of present illness above    Exam:  /74 (BP Location: Right arm, Patient Position: Sitting, BP Cuff Size: Large adult)   Pulse 88   Temp 36.9 °C (98.4 °F)   Resp 16   Ht 1.524 m (5')   Wt 96.2 kg (212 lb)   SpO2 95%   Constitutional: Alert, no distress, plus 3 vital signs  Skin:  Warm, dry, no rashes invisible areas  Eye: Equal, round and reactive, conjunctiva clear  ENMT: Lips without lesions, good dentition, oropharynx clear    Neck: Trachea midline, no masses, no thyromegaly  Respiratory: Unlabored respiration.  Cardiovascular: Normal rate and rhythm.  Psych: Alert, pleasant, well-groomed, normal affect    Assessment / Plan / Medical Decision making:   \"  1. Generalized anxiety disorder  venlafaxine XR (EFFEXOR XR) 75 MG CAPSULE SR 24 HR    ALPRAZolam (XANAX) 0.5 MG Tab    TSH    FREE THYROXINE   Because of random onset panic attacks during the day when she states " that she is not thinking about anything which would cause her a panic attack, recommend an updated TSH and T4.  She was restarted on venlafaxine at 75 mg with the capabilities of going up to 150 mg over the course of the next 2 to 4 weeks.  She was given a small prescription of alprazolam to use as needed for anxiety attacks.  She understands that she should not take Xanax within 6 hours of driving a car, operating machinery or drinking alcohol.  She understands the chemically dependent nature of Xanax.  Reviewed her  profile which does not show any controlled substances within the past 6 months.  Discussed potential side effects as well as length of onset efficacy of restarting venlafaxine.  Like to see her back here or hear from her on email within 4 weeks.  Offered a referral to a therapist which she declines.      In terms of her healthcare maintenance, she declines influenza and Tdap vaccines today.  Requested Pap from her gynecologist as she is uncertain regarding when she is due for this.

## 2020-02-24 DIAGNOSIS — F41.1 GENERALIZED ANXIETY DISORDER: ICD-10-CM

## 2020-02-24 RX ORDER — VENLAFAXINE HYDROCHLORIDE 150 MG/1
150 CAPSULE, EXTENDED RELEASE ORAL DAILY
Qty: 30 CAP | Refills: 5 | Status: SHIPPED | OUTPATIENT
Start: 2020-02-24 | End: 2020-08-26

## 2020-03-11 ENCOUNTER — APPOINTMENT (OUTPATIENT)
Dept: URGENT CARE | Facility: CLINIC | Age: 40
End: 2020-03-11
Payer: COMMERCIAL

## 2020-03-11 ENCOUNTER — TELEPHONE (OUTPATIENT)
Dept: HEALTH INFORMATION MANAGEMENT | Facility: OTHER | Age: 40
End: 2020-03-11

## 2020-03-11 RX ORDER — BENZONATATE 100 MG/1
100 CAPSULE ORAL 3 TIMES DAILY PRN
Qty: 50 CAP | Refills: 1 | Status: SHIPPED | OUTPATIENT
Start: 2020-03-11 | End: 2021-05-26

## 2020-03-11 NOTE — TELEPHONE ENCOUNTER
1. Caller Name: pt                    Call Back Number: [phone]  [facility] PCP or Specialty Provider: Yes         2.  Does patient have any active symptoms of respiratory illness (fever OR cough OR shortness of breath)? Yes, the patient reports the following respiratory symptoms: cough.  Pt reports she had flu one month ago.  Reports cough has been present for the past month.  Denies fever. Denies feeling short of breath.    3.  In the last 30 days, has the patient traveled outside of the country OR in a high risk area within the  OR have any known contact with someone who has?  No.    4.  Does patient have any comoribidities? None     5. Disposition:  Advised to perform self care, monitor for worsening symptoms and to call back in 3 days if no improvement. Discussed going to urgent care at [facility] if any worsening symptoms.     Note routed to PCP: Provider action needed: Patient is requesting a prescription for her cough.  She would like a call back. Her appt was cancelled at PCP office because of cough.

## 2020-03-11 NOTE — TELEPHONE ENCOUNTER
Please call patient.  Will you ask if she has tried antihistamine therapy such as Claritin, Allegra or Zyrtec?  These medications, if taken every day for about a week, will decrease a postinfectious inflammatory cough that typically lingers after the flu.  Also happy to send through [facility] Perles if she would like, these are symptomatic cough suppressants.

## 2020-04-27 RX ORDER — VENLAFAXINE HYDROCHLORIDE 75 MG/1
75 CAPSULE, EXTENDED RELEASE ORAL DAILY
Qty: 30 CAP | Refills: 3 | Status: SHIPPED | OUTPATIENT
Start: 2020-04-27 | End: 2020-08-26

## 2020-06-11 ENCOUNTER — OFFICE VISIT (OUTPATIENT)
Dept: MEDICAL GROUP | Facility: LAB | Age: 40
End: 2020-06-11
Payer: COMMERCIAL

## 2020-06-11 VITALS
TEMPERATURE: 98 F | SYSTOLIC BLOOD PRESSURE: 110 MMHG | OXYGEN SATURATION: 95 % | HEIGHT: 60 IN | HEART RATE: 90 BPM | WEIGHT: 195 LBS | DIASTOLIC BLOOD PRESSURE: 76 MMHG | RESPIRATION RATE: 16 BRPM | BODY MASS INDEX: 38.28 KG/M2

## 2020-06-11 DIAGNOSIS — Z30.432 ENCOUNTER FOR IUD REMOVAL: ICD-10-CM

## 2020-06-11 DIAGNOSIS — Z30.42 ENCOUNTER FOR SURVEILLANCE OF INJECTABLE CONTRACEPTIVE: ICD-10-CM

## 2020-06-11 PROBLEM — Z97.5 PRESENCE OF INTRAUTERINE CONTRACEPTIVE DEVICE: Status: RESOLVED | Noted: 2019-08-05 | Resolved: 2020-06-11

## 2020-06-11 PROCEDURE — 99213 OFFICE O/P EST LOW 20 MIN: CPT | Mod: 25 | Performed by: NURSE PRACTITIONER

## 2020-06-11 PROCEDURE — 58301 REMOVE INTRAUTERINE DEVICE: CPT | Performed by: NURSE PRACTITIONER

## 2020-06-11 RX ORDER — RIZATRIPTAN BENZOATE 10 MG/1
10 TABLET, ORALLY DISINTEGRATING ORAL
Qty: 10 TAB | Refills: 3 | Status: SHIPPED | OUTPATIENT
Start: 2020-06-11

## 2020-06-11 RX ORDER — MEDROXYPROGESTERONE ACETATE 150 MG/ML
150 INJECTION, SUSPENSION INTRAMUSCULAR ONCE
Status: COMPLETED | OUTPATIENT
Start: 2020-06-11 | End: 2020-06-11

## 2020-06-11 RX ADMIN — MEDROXYPROGESTERONE ACETATE 150 MG: 150 INJECTION, SUSPENSION INTRAMUSCULAR at 16:25

## 2020-06-11 NOTE — PROGRESS NOTES
Chief Complaint   Patient presents with   • IUD Removal   • Contraception     requesting Depo       HPI  40-year-old established female here with request for IUD removal.  IUD was placed almost 1 year ago, Mirena.  Dislikes IUD b/c of mood changes, PMS symptoms, irregular spotting, tender breasts, migraines worsening.    OCP triggered horrible migraines for years.    Depo provera injections were great for her, per patient.      Chronic migraine: This is been an issue for her for years.  Has been seen by neurology for several years as well, stopped going because medications were not helping her.  Has tried multiple preventative medications including Topamax, beta-blockers and SSRI therapy.  Currently on venlafaxine for anxiety.  Typically 10 migraines per month - lasting 1-2 days.  Using only Excedrin migraine right now and has to take up to 11 to calm down migraines.    Failed Imitrex and Zomig abortively.    Past medical, surgical, family, and social history is reviewed and updated in Epic chart by me today.   Medications and allergies reviewed and updated in Epic chart by me today.     ROS:   As documented in history of present illness above    Exam:  /76 (BP Location: Left arm, Patient Position: Sitting, BP Cuff Size: Large adult)   Pulse 90   Temp 36.7 °C (98 °F)   Resp 16   Ht 1.524 m (5')   Wt 88.5 kg (195 lb)   SpO2 95%   Constitutional: Alert, no distress, plus 3 vital signs  Skin:  Warm, dry, no rashes invisible areas  Eye: Equal, round and reactive, conjunctiva clear  ENMT: Lips without lesions.  Respiratory: Unlabored respiration.  Cardiovascular: Normal rate and rhythm.  : the vaginal speculum was placed and a ring forceps used to grasp the   Mirena IUD strings and the IUD was removed intact without any problems, patient reported minimal discomfort and there was no bleeding.  Psych: Alert, pleasant, well-groomed, normal affect    Assessment / Plan / Medical Decision makin. Chronic  migraine  -Trial of abortive Maxalt at onset of next migraine, emailing me with efficacy.  Discussed potential side effects and how to take rizatriptan.  Also briefly discussed preventative medications such as Aimovig, Emgality  -she will investigate coverage by her insurance company.  - rizatriptan (MAXALT-MLT) 10 MG disintegrating tablet; Take 1 Tab by mouth Once PRN for Migraine for up to 1 dose. Repeat in 2 hours once if migraine persists  Dispense: 10 Tab; Refill: 3    2. Encounter for surveillance of injectable contraceptive  -Pt counseled on Depo-Provera. Risks, benefits and complications from hormone use reviewed. Failure rates discussed. Back up contraception and STD prevention discussed.  Discussed the potential for osteoporosis.  Recommend bone densities every 3 years, calcium and vitamin D as well as weightbearing exercise.  - medroxyPROGESTERone (DEPO-PROVERA) injection 150 mg    3. Encounter for IUD removal  -See procedure note and exam.  IUD easily removed.  IUD removed per patient request.

## 2020-08-26 DIAGNOSIS — F41.1 GENERALIZED ANXIETY DISORDER: ICD-10-CM

## 2020-08-26 RX ORDER — VENLAFAXINE HYDROCHLORIDE 150 MG/1
CAPSULE, EXTENDED RELEASE ORAL
Qty: 30 CAP | Refills: 2 | Status: SHIPPED | OUTPATIENT
Start: 2020-08-26 | End: 2020-12-01

## 2020-08-26 RX ORDER — VENLAFAXINE HYDROCHLORIDE 75 MG/1
CAPSULE, EXTENDED RELEASE ORAL
Qty: 30 CAP | Refills: 2 | Status: SHIPPED | OUTPATIENT
Start: 2020-08-26 | End: 2020-12-01

## 2020-08-26 NOTE — TELEPHONE ENCOUNTER
Received request via: Patient    Was the patient seen in the last year in this department? Yes  6/11/2020  Does the patient have an active prescription (recently filled or refills available) for medication(s) requested? No

## 2020-09-09 ENCOUNTER — NON-PROVIDER VISIT (OUTPATIENT)
Dept: MEDICAL GROUP | Facility: LAB | Age: 40
End: 2020-09-09
Payer: COMMERCIAL

## 2020-09-09 DIAGNOSIS — Z30.42 ENCOUNTER FOR SURVEILLANCE OF INJECTABLE CONTRACEPTIVE: ICD-10-CM

## 2020-09-09 PROCEDURE — 96372 THER/PROPH/DIAG INJ SC/IM: CPT | Performed by: NURSE PRACTITIONER

## 2020-09-09 RX ORDER — MEDROXYPROGESTERONE ACETATE 150 MG/ML
150 INJECTION, SUSPENSION INTRAMUSCULAR ONCE
Status: COMPLETED | OUTPATIENT
Start: 2020-09-09 | End: 2020-09-09

## 2020-09-09 RX ORDER — MEDROXYPROGESTERONE ACETATE 150 MG/ML
150 INJECTION, SUSPENSION INTRAMUSCULAR ONCE
Status: CANCELLED | OUTPATIENT
Start: 2020-09-09 | End: 2020-09-09

## 2020-09-09 RX ADMIN — MEDROXYPROGESTERONE ACETATE 150 MG: 150 INJECTION, SUSPENSION INTRAMUSCULAR at 13:52

## 2020-09-09 NOTE — PROGRESS NOTES
Fredo Roblero is a 40 y.o. here for a Depo Provera Injection.     Date of last Depo Provera Injection: Fredo Roblero is a 40 y.o. here for a Depo Provera Injection.     Date of last Depo Provera Injection: 6/11/20  Current date within therapeutic range?: Yes   Urine pregnancy test done (needed if out of date range): not performed  Date of office visit:6/11/20  Date of last pap (if > 21 years old)/ GYN exam: 7/3/19  Dx: Contraceptive use

## 2020-10-29 DIAGNOSIS — F41.1 GENERALIZED ANXIETY DISORDER: ICD-10-CM

## 2020-10-29 NOTE — TELEPHONE ENCOUNTER
Received request via: Patient  Was the patient seen in the last year in this department? Yes  6/11/20  Does the patient have an active prescription (recently filled or refills available) for medication(s) requested? No

## 2020-10-29 NOTE — TELEPHONE ENCOUNTER
----- Message from Christal Roblero sent at 10/29/2020  7:02 AM PDT -----  Regarding: Prescription Question  Contact: [phone]  Good morning can I please get a refill for Xanax?  I just have a little more anxiety at times. If I need to schedule an appointment I can, just let me know. Thank you

## 2020-10-30 RX ORDER — ALPRAZOLAM 0.5 MG/1
.25-.5 TABLET ORAL 2 TIMES DAILY PRN
Qty: 20 TAB | Refills: 0 | Status: SHIPPED | OUTPATIENT
Start: 2020-10-30 | End: 2021-08-25

## 2020-12-08 ENCOUNTER — NON-PROVIDER VISIT (OUTPATIENT)
Dept: MEDICAL GROUP | Facility: LAB | Age: 40
End: 2020-12-08
Payer: COMMERCIAL

## 2020-12-08 PROCEDURE — 96372 THER/PROPH/DIAG INJ SC/IM: CPT | Performed by: NURSE PRACTITIONER

## 2020-12-08 RX ORDER — MEDROXYPROGESTERONE ACETATE 150 MG/ML
150 INJECTION, SUSPENSION INTRAMUSCULAR ONCE
Status: COMPLETED | OUTPATIENT
Start: 2020-12-08 | End: 2020-12-08

## 2020-12-08 RX ADMIN — MEDROXYPROGESTERONE ACETATE 150 MG: 150 INJECTION, SUSPENSION INTRAMUSCULAR at 16:47

## 2020-12-08 NOTE — PROGRESS NOTES
Hough-Maribel Roblero is a 40 y.o. here for a Depo Provera Injection.     Date of last Depo Provera Injection: 9/9/2020  Current date within therapeutic range?: Yes   Urine pregnancy test done (needed if out of date range): not performed  Date of office visit:12/8/2020  Date of last pap (if > 21 years old)/ GYN exam: 7/3/2019  Dx: Contraceptive use    Order and dose verified by: DF  Patient tolerated injection and no adverse effects were observed or reported: Yes    # of Administrations remaining in MAR: 1  Next injection due between Feb 24 and Mar 10.

## 2021-03-04 ENCOUNTER — NON-PROVIDER VISIT (OUTPATIENT)
Dept: MEDICAL GROUP | Facility: LAB | Age: 41
End: 2021-03-04
Payer: COMMERCIAL

## 2021-03-04 PROCEDURE — 96372 THER/PROPH/DIAG INJ SC/IM: CPT | Performed by: NURSE PRACTITIONER

## 2021-03-04 RX ORDER — MEDROXYPROGESTERONE ACETATE 150 MG/ML
150 INJECTION, SUSPENSION INTRAMUSCULAR ONCE
Status: COMPLETED | OUTPATIENT
Start: 2021-03-04 | End: 2021-03-04

## 2021-03-04 RX ADMIN — MEDROXYPROGESTERONE ACETATE 150 MG: 150 INJECTION, SUSPENSION INTRAMUSCULAR at 02:15

## 2021-03-04 NOTE — PROGRESS NOTES
Hough-Maribel Roblero is a 40 y.o. here for a Depo Provera Injection.      Date of last  Injection: 12/8/20  Current date within therapeutic range?: Yes   Urine pregnancy test done (needed if out of date range): not performed  Date of office visit:6/11/20  Date of last pap (if > 21 years old)/ GYN exam: 7/3/19  Dx: Contraceptive use    Order and dose verified by: DF  Patient tolerated injection and no adverse effects were observed or reported: Yes    # of Administrations remaining in MAR:   Next injection due between may 20 and june3.

## 2021-05-26 ENCOUNTER — OFFICE VISIT (OUTPATIENT)
Dept: MEDICAL GROUP | Facility: LAB | Age: 41
End: 2021-05-26
Payer: COMMERCIAL

## 2021-05-26 VITALS
SYSTOLIC BLOOD PRESSURE: 116 MMHG | BODY MASS INDEX: 31.66 KG/M2 | OXYGEN SATURATION: 95 % | TEMPERATURE: 96.8 F | DIASTOLIC BLOOD PRESSURE: 76 MMHG | WEIGHT: 197 LBS | HEIGHT: 66 IN | RESPIRATION RATE: 12 BRPM | HEART RATE: 92 BPM

## 2021-05-26 DIAGNOSIS — Z00.00 PREVENTATIVE HEALTH CARE: ICD-10-CM

## 2021-05-26 DIAGNOSIS — Z30.42 ENCOUNTER FOR SURVEILLANCE OF INJECTABLE CONTRACEPTIVE: ICD-10-CM

## 2021-05-26 DIAGNOSIS — Z00.00 WELL ADULT EXAM: ICD-10-CM

## 2021-05-26 DIAGNOSIS — F41.1 GENERALIZED ANXIETY DISORDER: ICD-10-CM

## 2021-05-26 PROCEDURE — 99396 PREV VISIT EST AGE 40-64: CPT | Performed by: NURSE PRACTITIONER

## 2021-05-26 RX ORDER — MEDROXYPROGESTERONE ACETATE 150 MG/ML
150 INJECTION, SUSPENSION INTRAMUSCULAR ONCE
Status: COMPLETED | OUTPATIENT
Start: 2021-05-26 | End: 2021-05-26

## 2021-05-26 RX ORDER — VENLAFAXINE HYDROCHLORIDE 150 MG/1
CAPSULE, EXTENDED RELEASE ORAL
Qty: 90 CAPSULE | Refills: 3 | Status: SHIPPED | OUTPATIENT
Start: 2021-05-26 | End: 2022-05-31

## 2021-05-26 RX ORDER — VENLAFAXINE HYDROCHLORIDE 75 MG/1
CAPSULE, EXTENDED RELEASE ORAL
Qty: 90 CAPSULE | Refills: 3 | Status: SHIPPED | OUTPATIENT
Start: 2021-05-26 | End: 2022-05-31

## 2021-05-26 RX ADMIN — MEDROXYPROGESTERONE ACETATE 150 MG: 150 INJECTION, SUSPENSION INTRAMUSCULAR at 15:55

## 2021-05-26 ASSESSMENT — PATIENT HEALTH QUESTIONNAIRE - PHQ9: CLINICAL INTERPRETATION OF PHQ2 SCORE: 0

## 2021-05-26 NOTE — PROGRESS NOTES
Chief Complaint   Patient presents with   • Annual Exam       HPI:  40 y.o. est female who presents for annual exam. Generally the patient is feeling good. She has no complaints or concerns.   Regarding her health maintenance:   Pap smear is up-to-date, last done July 3 of 2018 and normal.  She does very well with Depo-Provera which keeps her amenorrheic, otherwise her menses are extremely painful and heavy.  Bone density is up-to-date from 2019 which was within normal limits.  Last Mammo overdue, patient plans on scheduling  Last colonoscopy:not applicable   Bone density test:N\A   Last Lab: due for follow up   Last Td:current   Influenza vaccination:current   Pneumococcal vaccination:not applicable   Hx STD''s: no   Regular exercise: yes  Mood stable with venlafaxine.  Headaches have improved with more sleep and less stress, still using rizatriptan abortively as needed.      meds:   Current Outpatient Medications   Medication Sig Dispense Refill   • venlafaxine XR (EFFEXOR XR) 75 MG CAPSULE SR 24 HR TAKE ONE CAPSULE BY MOUTH ONE TIME DAILY. IN ADDITION  MG XR VENLAFAXINE CAPSULE 90 capsule 3   • venlafaxine (EFFEXOR-XR) 150 MG extended-release capsule TAKE ONE CAPSULE BY MOUTH ONE TIME DAILY 90 capsule 3   • rizatriptan (MAXALT-MLT) 10 MG disintegrating tablet Take 1 Tab by mouth Once PRN for Migraine for up to 1 dose. Repeat in 2 hours once if migraine persists 10 Tab 3   • ibuprofen (MOTRIN) 600 MG Tab Take 1 Tab by mouth every 6 hours as needed for Moderate Pain. 30 Tab 0     Current Facility-Administered Medications   Medication Dose Route Frequency Provider Last Rate Last Admin   • medroxyPROGESTERone (DEPO-PROVERA) injection 150 mg  150 mg Intramuscular Once Meera Hightower, A.P.N.           Allergies: No Known Allergies    family:   Family History   Problem Relation Age of Onset   • Heart Disease Mother         irregular heartbeat   • Thyroid Sister         hypo       social hx:   Social History      Socioeconomic History   • Marital status:      Spouse name: Not on file   • Number of children: Not on file   • Years of education: Not on file   • Highest education level: Not on file   Occupational History   • Not on file   Tobacco Use   • Smoking status: Never Smoker   • Smokeless tobacco: Never Used   Substance and Sexual Activity   • Alcohol use: Yes     Comment: rare; on occasion    • Drug use: Yes     Comment: thc   • Sexual activity: Yes     Partners: Male     Birth control/protection: Injection     Comment: ; two kids; wk: 2 jobs ( / [employer])   Other Topics Concern   • Not on file   Social History Narrative   • Not on file     Social Determinants of Health     Financial Resource Strain:    • Difficulty of Paying Living Expenses:    Food Insecurity:    • Worried About Running Out of Food in the Last Year:    • Ran Out of Food in the Last Year:    Transportation Needs:    • Lack of Transportation (Medical):    • Lack of Transportation (Non-Medical):    Physical Activity:    • Days of Exercise per Week:    • Minutes of Exercise per Session:    Stress:    • Feeling of Stress :    Social Connections:    • Frequency of Communication with Friends and Family:    • Frequency of Social Gatherings with Friends and Family:    • Attends [employer] Services:    • Active Member of Clubs or Organizations:    • Attends Club or Organization Meetings:    • Marital Status:    Intimate Partner Violence:    • Fear of Current or Ex-Partner:    • Emotionally Abused:    • Physically Abused:    • Sexually Abused:        ROS:  No fever, chills, sweats.   No polydipsia, polyuria, temperature intolerance, significant weight changes   No visual changes, blurred vision.  No chest pain, palpitations, peripheral swelling   No chronic cough, shortness of breath, dyspnea with exertion.   No dysphagia, odynophagia, black or bloody stools.   No abdominal pain, nausea, persistent diarrhea, constipation   No dysuria,  "hematuria, incontinence. Denies nocturia  No rash, pruritis, pigment changes.   No focal weakness, syncope, headache, confusion, persistent numbness.   All other systems are reviewed and negative.    PHYSICAL EXAMINATION:  /76 (BP Location: Left arm, Patient Position: Sitting, BP Cuff Size: Large adult)   Pulse 92   Temp 36 °C (96.8 °F)   Resp 12   Ht 1.676 m (5' 6\")   Wt 89.4 kg (197 lb)   SpO2 95%   General appearance:healthy, well developed, well nourished  Psych: alert, no distress, cooperative  Eyes: EOM's normal, pupils equal, round, reactive to light  ENT: Ears: external ears normal to inspection and palpation, TM's clear, Nose/Sinuses: nose shows no deformity, asymmetry, or inflammation  Neck: no asymmetry, masses, or scars, no adenopathy, thyroid normal to palpation  Lungs:chest symmetric with normal A/P diameter, no chest deformities noted, normal respiratory rate and rhythm  Cardiovascular:regular rate and rhythm, S1 normal  Abdomen: umbilicus normal, no masses palpable, no organomegaly  Musculoskeletal:ROM of all joints is normal, no evidence of joint instability  Lymphatic: None significantly enlarged  Skin: no rash, no edema  Neuro: mental status intact, cranial nerves 2-12 intact      ASSESSMENT/PLAN:  1.annual physical exam: HCM:  Encourage monthly self breast exam  Encourage daily exercise for at least 30 minutes  Recommend mammogram yearly, repeat DEXA scan next year because of Depo-Provera use.  Recommend 1500 mg Calcium with 600 units vit d daily.    Follow up one year for every 3 year PAP  Recommend CBC, CMP, TSH, LP - fasting.  Encouraged Covid vaccination.    "

## 2021-05-28 ENCOUNTER — HOSPITAL ENCOUNTER (OUTPATIENT)
Dept: LAB | Facility: MEDICAL CENTER | Age: 41
End: 2021-05-28
Attending: NURSE PRACTITIONER
Payer: COMMERCIAL

## 2021-05-28 DIAGNOSIS — Z00.00 PREVENTATIVE HEALTH CARE: ICD-10-CM

## 2021-05-28 LAB
ALBUMIN SERPL BCP-MCNC: 4.2 G/DL (ref 3.2–4.9)
ALBUMIN/GLOB SERPL: 1.4 G/DL
ALP SERPL-CCNC: 56 U/L (ref 30–99)
ALT SERPL-CCNC: 11 U/L (ref 2–50)
ANION GAP SERPL CALC-SCNC: 10 MMOL/L (ref 7–16)
AST SERPL-CCNC: 8 U/L (ref 12–45)
BASOPHILS # BLD AUTO: 0.8 % (ref 0–1.8)
BASOPHILS # BLD: 0.06 K/UL (ref 0–0.12)
BILIRUB SERPL-MCNC: 0.2 MG/DL (ref 0.1–1.5)
BUN SERPL-MCNC: 11 MG/DL (ref 8–22)
CALCIUM SERPL-MCNC: 9.1 MG/DL (ref 8.5–10.5)
CHLORIDE SERPL-SCNC: 108 MMOL/L (ref 96–112)
CHOLEST SERPL-MCNC: 165 MG/DL (ref 100–199)
CO2 SERPL-SCNC: 23 MMOL/L (ref 20–33)
CREAT SERPL-MCNC: 0.81 MG/DL (ref 0.5–1.4)
EOSINOPHIL # BLD AUTO: 0.1 K/UL (ref 0–0.51)
EOSINOPHIL NFR BLD: 1.4 % (ref 0–6.9)
ERYTHROCYTE [DISTWIDTH] IN BLOOD BY AUTOMATED COUNT: 44.8 FL (ref 35.9–50)
FASTING STATUS PATIENT QL REPORTED: NORMAL
GLOBULIN SER CALC-MCNC: 2.9 G/DL (ref 1.9–3.5)
GLUCOSE SERPL-MCNC: 92 MG/DL (ref 65–99)
HCT VFR BLD AUTO: 47 % (ref 37–47)
HDLC SERPL-MCNC: 53 MG/DL
HGB BLD-MCNC: 15.3 G/DL (ref 12–16)
IMM GRANULOCYTES # BLD AUTO: 0.04 K/UL (ref 0–0.11)
IMM GRANULOCYTES NFR BLD AUTO: 0.6 % (ref 0–0.9)
LDLC SERPL CALC-MCNC: 99 MG/DL
LYMPHOCYTES # BLD AUTO: 2.46 K/UL (ref 1–4.8)
LYMPHOCYTES NFR BLD: 34.4 % (ref 22–41)
MCH RBC QN AUTO: 29.9 PG (ref 27–33)
MCHC RBC AUTO-ENTMCNC: 32.6 G/DL (ref 33.6–35)
MCV RBC AUTO: 91.8 FL (ref 81.4–97.8)
MONOCYTES # BLD AUTO: 0.55 K/UL (ref 0–0.85)
MONOCYTES NFR BLD AUTO: 7.7 % (ref 0–13.4)
NEUTROPHILS # BLD AUTO: 3.95 K/UL (ref 2–7.15)
NEUTROPHILS NFR BLD: 55.1 % (ref 44–72)
NRBC # BLD AUTO: 0 K/UL
NRBC BLD-RTO: 0 /100 WBC
PLATELET # BLD AUTO: 323 K/UL (ref 164–446)
PMV BLD AUTO: 9.6 FL (ref 9–12.9)
POTASSIUM SERPL-SCNC: 4.3 MMOL/L (ref 3.6–5.5)
PROT SERPL-MCNC: 7.1 G/DL (ref 6–8.2)
RBC # BLD AUTO: 5.12 M/UL (ref 4.2–5.4)
SODIUM SERPL-SCNC: 141 MMOL/L (ref 135–145)
TRIGL SERPL-MCNC: 63 MG/DL (ref 0–149)
TSH SERPL DL<=0.005 MIU/L-ACNC: 1.91 UIU/ML (ref 0.38–5.33)
WBC # BLD AUTO: 7.2 K/UL (ref 4.8–10.8)

## 2021-05-28 PROCEDURE — 85025 COMPLETE CBC W/AUTO DIFF WBC: CPT

## 2021-05-28 PROCEDURE — 84443 ASSAY THYROID STIM HORMONE: CPT

## 2021-05-28 PROCEDURE — 80053 COMPREHEN METABOLIC PANEL: CPT

## 2021-05-28 PROCEDURE — 36415 COLL VENOUS BLD VENIPUNCTURE: CPT

## 2021-05-28 PROCEDURE — 80061 LIPID PANEL: CPT

## 2021-08-12 ENCOUNTER — NON-PROVIDER VISIT (OUTPATIENT)
Dept: MEDICAL GROUP | Facility: LAB | Age: 41
End: 2021-08-12
Payer: COMMERCIAL

## 2021-08-12 DIAGNOSIS — Z30.42 ENCOUNTER FOR SURVEILLANCE OF INJECTABLE CONTRACEPTIVE: ICD-10-CM

## 2021-08-12 PROCEDURE — 96372 THER/PROPH/DIAG INJ SC/IM: CPT | Performed by: NURSE PRACTITIONER

## 2021-08-12 RX ORDER — MEDROXYPROGESTERONE ACETATE 150 MG/ML
150 INJECTION, SUSPENSION INTRAMUSCULAR ONCE
Status: COMPLETED | OUTPATIENT
Start: 2021-08-12 | End: 2021-08-12

## 2021-08-12 RX ADMIN — MEDROXYPROGESTERONE ACETATE 150 MG: 150 INJECTION, SUSPENSION INTRAMUSCULAR at 02:25

## 2021-08-12 NOTE — PROGRESS NOTES
Hough-Maribel Roblero is a 41 y.o. here for a Depo Provera Injection.     Date of last Depo Provera Injection: contraceptive  Current date within therapeutic range?: Yes   Urine pregnancy test done (needed if out of date range): not performed  Date of last office visit:5/26/21  Date of last pap (if > 21 years old)/ GYN exam: 8/5/2019  Dx: Contraceptive use    Patient tolerated injection and no adverse effects were observed or reported: Yes    # of Administrations remaining in MAR:   Next injection due between 10-29 and 11/11.

## 2021-08-25 DIAGNOSIS — F41.1 GENERALIZED ANXIETY DISORDER: ICD-10-CM

## 2021-08-25 RX ORDER — ALPRAZOLAM 0.5 MG/1
.25-.5 TABLET ORAL 2 TIMES DAILY PRN
Qty: 20 TABLET | Refills: 0 | Status: SHIPPED | OUTPATIENT
Start: 2021-08-25 | End: 2022-07-13 | Stop reason: SDUPTHER

## 2021-08-25 NOTE — TELEPHONE ENCOUNTER
----- Message from Christal Roblero sent at 8/25/2021 12:38 PM PDT -----  Regarding: Xanax refill  Good afternoon, I would like to see if I can get a xanax refill. It's been quite some time. I jut have a lot of stress going on. Please let me know if I need to schedule an appointment.  Thank you so much.  Christal

## 2021-08-25 NOTE — TELEPHONE ENCOUNTER
Received request via: PATIENT  Was the patient seen in the last year in this department? Yes  5/26/21  Does the patient have an active prescription (recently filled or refills available) for medication(s) requested? No

## 2021-11-10 ENCOUNTER — NON-PROVIDER VISIT (OUTPATIENT)
Dept: MEDICAL GROUP | Facility: LAB | Age: 41
End: 2021-11-10

## 2021-11-10 ENCOUNTER — APPOINTMENT (OUTPATIENT)
Dept: MEDICAL GROUP | Facility: LAB | Age: 41
End: 2021-11-10

## 2021-11-10 DIAGNOSIS — Z30.42 ENCOUNTER FOR SURVEILLANCE OF INJECTABLE CONTRACEPTIVE: ICD-10-CM

## 2021-11-10 PROCEDURE — 96372 THER/PROPH/DIAG INJ SC/IM: CPT | Performed by: NURSE PRACTITIONER

## 2021-11-10 RX ORDER — MEDROXYPROGESTERONE ACETATE 150 MG/ML
150 INJECTION, SUSPENSION INTRAMUSCULAR ONCE
Status: COMPLETED | OUTPATIENT
Start: 2021-11-10 | End: 2021-11-10

## 2021-11-10 RX ADMIN — MEDROXYPROGESTERONE ACETATE 150 MG: 150 INJECTION, SUSPENSION INTRAMUSCULAR at 15:00

## 2022-02-07 ENCOUNTER — NON-PROVIDER VISIT (OUTPATIENT)
Dept: MEDICAL GROUP | Facility: LAB | Age: 42
End: 2022-02-07

## 2022-02-07 DIAGNOSIS — Z30.42 ENCOUNTER FOR SURVEILLANCE OF INJECTABLE CONTRACEPTIVE: ICD-10-CM

## 2022-02-07 PROCEDURE — 96372 THER/PROPH/DIAG INJ SC/IM: CPT | Performed by: NURSE PRACTITIONER

## 2022-02-07 RX ORDER — MEDROXYPROGESTERONE ACETATE 150 MG/ML
150 INJECTION, SUSPENSION INTRAMUSCULAR ONCE
Status: COMPLETED | OUTPATIENT
Start: 2022-02-07 | End: 2022-02-07

## 2022-02-07 RX ADMIN — MEDROXYPROGESTERONE ACETATE 150 MG: 150 INJECTION, SUSPENSION INTRAMUSCULAR at 11:55

## 2022-02-07 NOTE — PROGRESS NOTES
Hough-Maribel Roblero is a 41 y.o. here for a Depo Provera Injection.     Date of last Depo Provera Injection: 11/10/21  Current date within therapeutic range?: Yes   Urine pregnancy test done (needed if out of date range): not performed  Date of last office visit:5/26/21  Date of last pap (if > 21 years old)/ GYN exam: 2013  Dx: Contraceptive use    Patient tolerated injection and no adverse effects were observed or reported: Yes    # of Administrations remaining in MAR:   Next injection due between April 25 and may 9.

## 2022-05-29 DIAGNOSIS — F41.1 GENERALIZED ANXIETY DISORDER: ICD-10-CM

## 2022-05-31 RX ORDER — VENLAFAXINE HYDROCHLORIDE 75 MG/1
CAPSULE, EXTENDED RELEASE ORAL
Qty: 30 CAPSULE | Refills: 0 | Status: SHIPPED | OUTPATIENT
Start: 2022-05-31 | End: 2022-06-27

## 2022-05-31 RX ORDER — VENLAFAXINE HYDROCHLORIDE 150 MG/1
CAPSULE, EXTENDED RELEASE ORAL
Qty: 30 CAPSULE | Refills: 0 | Status: SHIPPED | OUTPATIENT
Start: 2022-05-31 | End: 2022-06-27

## 2022-06-01 ENCOUNTER — NON-PROVIDER VISIT (OUTPATIENT)
Dept: MEDICAL GROUP | Facility: LAB | Age: 42
End: 2022-06-01
Payer: COMMERCIAL

## 2022-06-01 DIAGNOSIS — Z30.42 ENCOUNTER FOR SURVEILLANCE OF INJECTABLE CONTRACEPTIVE: ICD-10-CM

## 2022-06-01 LAB
INT CON NEG: NEGATIVE
INT CON POS: POSITIVE
POC URINE PREGNANCY TEST: NEGATIVE

## 2022-06-01 PROCEDURE — 96372 THER/PROPH/DIAG INJ SC/IM: CPT | Performed by: NURSE PRACTITIONER

## 2022-06-01 PROCEDURE — 81025 URINE PREGNANCY TEST: CPT | Performed by: NURSE PRACTITIONER

## 2022-06-01 NOTE — PROGRESS NOTES
Hough-Maribel Roblero is a 41 y.o. here for a Depo Provera Injection.     Date of last Depo Provera Injection: 2/7/22  Current date within therapeutic range?: No   Urine pregnancy test done (needed if out of date range): yes--negative  Date of last office visit:5/26/21  Date of last pap (if > 21 years old)/ GYN exam: 7/3/19  Dx: Contraceptive use    Patient tolerated injection and no adverse effects were observed or reported: Yes    # of Administrations remaining in MAR: 0  Next injection due between 8/17 and 8/31.

## 2022-06-02 RX ORDER — MEDROXYPROGESTERONE ACETATE 150 MG/ML
150 INJECTION, SUSPENSION INTRAMUSCULAR ONCE
Status: COMPLETED | OUTPATIENT
Start: 2022-06-02 | End: 2022-06-02

## 2022-06-02 RX ADMIN — MEDROXYPROGESTERONE ACETATE 150 MG: 150 INJECTION, SUSPENSION INTRAMUSCULAR at 07:12

## 2022-06-27 DIAGNOSIS — F41.1 GENERALIZED ANXIETY DISORDER: ICD-10-CM

## 2022-06-27 RX ORDER — VENLAFAXINE HYDROCHLORIDE 75 MG/1
CAPSULE, EXTENDED RELEASE ORAL
Qty: 30 CAPSULE | Refills: 0 | Status: SHIPPED | OUTPATIENT
Start: 2022-06-27 | End: 2022-07-05 | Stop reason: SDUPTHER

## 2022-06-27 RX ORDER — VENLAFAXINE HYDROCHLORIDE 150 MG/1
CAPSULE, EXTENDED RELEASE ORAL
Qty: 30 CAPSULE | Refills: 0 | Status: SHIPPED | OUTPATIENT
Start: 2022-06-27 | End: 2022-07-05 | Stop reason: SDUPTHER

## 2022-07-05 ENCOUNTER — OFFICE VISIT (OUTPATIENT)
Dept: MEDICAL GROUP | Facility: LAB | Age: 42
End: 2022-07-05
Payer: COMMERCIAL

## 2022-07-05 VITALS
OXYGEN SATURATION: 94 % | DIASTOLIC BLOOD PRESSURE: 72 MMHG | BODY MASS INDEX: 34.55 KG/M2 | HEART RATE: 84 BPM | WEIGHT: 215 LBS | HEIGHT: 66 IN | TEMPERATURE: 97.9 F | RESPIRATION RATE: 12 BRPM | SYSTOLIC BLOOD PRESSURE: 108 MMHG

## 2022-07-05 DIAGNOSIS — Z00.00 WELL ADULT EXAM: ICD-10-CM

## 2022-07-05 DIAGNOSIS — Z12.31 ENCOUNTER FOR SCREENING MAMMOGRAM FOR BREAST CANCER: ICD-10-CM

## 2022-07-05 DIAGNOSIS — F41.1 GENERALIZED ANXIETY DISORDER: ICD-10-CM

## 2022-07-05 PROBLEM — O02.1 MISSED ABORTION: Status: RESOLVED | Noted: 2019-07-26 | Resolved: 2022-07-05

## 2022-07-05 PROBLEM — Z92.84 HISTORY OF UNINTENDED AWARENESS UNDER GENERAL ANESTHESIA: Status: RESOLVED | Noted: 2019-07-26 | Resolved: 2022-07-05

## 2022-07-05 PROCEDURE — 99396 PREV VISIT EST AGE 40-64: CPT | Performed by: NURSE PRACTITIONER

## 2022-07-05 RX ORDER — VENLAFAXINE HYDROCHLORIDE 75 MG/1
CAPSULE, EXTENDED RELEASE ORAL
Qty: 90 CAPSULE | Refills: 3 | Status: SHIPPED | OUTPATIENT
Start: 2022-07-05 | End: 2023-08-11 | Stop reason: SDUPTHER

## 2022-07-05 RX ORDER — VENLAFAXINE HYDROCHLORIDE 150 MG/1
150 CAPSULE, EXTENDED RELEASE ORAL DAILY
Qty: 90 CAPSULE | Refills: 3 | Status: SHIPPED | OUTPATIENT
Start: 2022-07-05 | End: 2023-08-11 | Stop reason: SDUPTHER

## 2022-07-05 ASSESSMENT — PATIENT HEALTH QUESTIONNAIRE - PHQ9: CLINICAL INTERPRETATION OF PHQ2 SCORE: 0

## 2022-07-05 ASSESSMENT — FIBROSIS 4 INDEX: FIB4 SCORE: 0.33

## 2022-07-05 NOTE — PROGRESS NOTES
CC  F/u     HPI:  Christal is a 42 y.o. est female who presents for annual exam. Generally the patient is feeling good. She has no complaints or concerns.  She is due for a Pap, mammogram and blood work.  Physically states that she feels well and does not want blood work.  She also states she will return for a Pap smear.  Denies breast pain.  Still periodically having headaches but rarely has to take rizatriptan as Excedrin Migraine helps her abort the headache.  Takes Advil PM to sleep which works for her.  Periodically having heartburn but denies black or bloody stools.  Regarding her health maintenance:   Last pap: 2019  Abnormal Pap hx: LGSIL  Periods: Amenorrhea with Depo  Bone density within normal limits 2019  Last Mammo:not applicable   Last colonoscopy:not applicable   Last Lab: 1 year ago and within normal limits  Last Td:current   Influenza vaccination:current   Pneumococcal vaccination:not applicable   Hx STD''s: no   Regular exercise: yes      meds:   Current Outpatient Medications   Medication Sig Dispense Refill   • venlafaxine XR (EFFEXOR XR) 75 MG CAPSULE SR 24 HR Take one daily 90 Capsule 3   • venlafaxine (EFFEXOR-XR) 150 MG extended-release capsule Take 1 Capsule by mouth every day. 90 Capsule 3   • rizatriptan (MAXALT-MLT) 10 MG disintegrating tablet Take 1 Tab by mouth Once PRN for Migraine for up to 1 dose. Repeat in 2 hours once if migraine persists 10 Tab 3     No current facility-administered medications for this visit.       Allergies: No Known Allergies    family:   Family History   Problem Relation Age of Onset   • Heart Disease Mother         irregular heartbeat   • Thyroid Sister         hypo       social hx:   Social History     Socioeconomic History   • Marital status:      Spouse name: Not on file   • Number of children: Not on file   • Years of education: Not on file   • Highest education level: Not on file   Occupational History   • Not on file   Tobacco Use   • Smoking  "status: Never Smoker   • Smokeless tobacco: Never Used   Substance and Sexual Activity   • Alcohol use: Yes     Comment: rare; on occasion    • Drug use: Yes     Comment: thc   • Sexual activity: Yes     Partners: Male     Birth control/protection: Injection     Comment: ; 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   No polydipsia, polyuria, temperature intolerance, significant weight changes   No visual changes, blurred vision.  No chest pain, palpitations, peripheral swelling   No chronic cough, shortness of breath, dyspnea with exertion.   No dysphagia, odynophagia, black or bloody stools.   No abdominal pain, nausea, persistent diarrhea, constipation   No dysuria, hematuria, incontinence. Denies nocturia  No rash, pruritis, pigment changes.   No focal weakness, syncope, headache, confusion, persistent numbness.   All other systems are reviewed and negative.    PHYSICAL EXAMINATION:  /72 (BP Location: Left arm, Patient Position: Sitting, BP Cuff Size: Large adult)   Pulse 84   Temp 36.6 °C (97.9 °F)   Resp 12   Ht 1.676 m (5' 6\")   Wt 97.5 kg (215 lb)   SpO2 94%   General appearance:healthy, well developed, well nourished  Psych: alert, no distress, cooperative  Eyes: EOM's normal, pupils equal, round, reactive to light  ENT: Ears: external ears normal to inspection and palpation, TM's clear, Nose/Sinuses: nose shows no deformity, asymmetry, or inflammation  Neck: no asymmetry, masses, or scars, no adenopathy, thyroid normal to palpation  Lungs:chest symmetric with normal " "A/P diameter, no chest deformities noted, normal respiratory rate and rhythm  Cardiovascular:regular rate and rhythm, S1 normal  Abdomen: umbilicus normal, no masses palpable, no organomegaly  Musculoskeletal:ROM of all joints is normal, no evidence of joint instability  Lymphatic: None significantly enlarged  Skin: no rash, no edema  Neuro: mental status intact, cranial nerves 2-12 intact      ASSESSMENT/PLAN:  \"  1. Well adult exam     2. Encounter for screening mammogram for breast cancer  MA-SCREENING MAMMO BILAT W/TOMOSYNTHESIS W/CAD    CANCELED: MA-SCREENING MAMMO BILAT W/CAD   3. Generalized anxiety disorder  venlafaxine XR (EFFEXOR XR) 75 MG CAPSULE SR 24 HR    venlafaxine (EFFEXOR-XR) 150 MG extended-release capsule   \"  Recommend updated mammogram.  Declines Pap today, stating she will return for this.  Declines updated blood work, stating she feels well.  Encouraged COVID vaccination.  Discussed paxlovid if she does contract COVID, considering that she is not vaccinated.  Encouraged continued weightbearing exercise for bone health considering chronic Depo use.  Repeat dexa 2024.  Colonoscopy age 48.   Moods stable with venlafaxine.  Headaches controlled with as needed rizatriptan or Excedrin Migraine.  Discussed GI protection with Tums and/or Pepcid AC with chronic nightly NSAID use.    Follow-up when she is ready for Pap smear.    "

## 2022-07-13 DIAGNOSIS — F41.1 GENERALIZED ANXIETY DISORDER: ICD-10-CM

## 2022-07-13 RX ORDER — ALPRAZOLAM 0.5 MG/1
.25-.5 TABLET ORAL 2 TIMES DAILY PRN
Qty: 20 TABLET | Refills: 0 | Status: SHIPPED | OUTPATIENT
Start: 2022-07-13 | End: 2022-09-19 | Stop reason: SDUPTHER

## 2022-09-06 ENCOUNTER — OFFICE VISIT (OUTPATIENT)
Dept: MEDICAL GROUP | Facility: LAB | Age: 42
End: 2022-09-06
Payer: COMMERCIAL

## 2022-09-06 ENCOUNTER — HOSPITAL ENCOUNTER (OUTPATIENT)
Facility: MEDICAL CENTER | Age: 42
End: 2022-09-06
Attending: NURSE PRACTITIONER
Payer: COMMERCIAL

## 2022-09-06 VITALS
HEART RATE: 88 BPM | WEIGHT: 219 LBS | HEIGHT: 66 IN | RESPIRATION RATE: 12 BRPM | TEMPERATURE: 96.3 F | SYSTOLIC BLOOD PRESSURE: 120 MMHG | OXYGEN SATURATION: 95 % | BODY MASS INDEX: 35.2 KG/M2 | DIASTOLIC BLOOD PRESSURE: 76 MMHG

## 2022-09-06 DIAGNOSIS — R10.2 ADNEXAL TENDERNESS: ICD-10-CM

## 2022-09-06 DIAGNOSIS — Z30.42 ENCOUNTER FOR SURVEILLANCE OF INJECTABLE CONTRACEPTIVE: ICD-10-CM

## 2022-09-06 DIAGNOSIS — Z01.419 ENCOUNTER FOR GYNECOLOGICAL EXAMINATION: ICD-10-CM

## 2022-09-06 DIAGNOSIS — Z12.4 SCREENING FOR MALIGNANT NEOPLASM OF CERVIX: ICD-10-CM

## 2022-09-06 LAB
INT CON NEG: NEGATIVE
INT CON POS: POSITIVE
POC URINE PREGNANCY TEST: NEGATIVE

## 2022-09-06 PROCEDURE — 99000 SPECIMEN HANDLING OFFICE-LAB: CPT | Performed by: NURSE PRACTITIONER

## 2022-09-06 PROCEDURE — 81025 URINE PREGNANCY TEST: CPT | Performed by: NURSE PRACTITIONER

## 2022-09-06 PROCEDURE — 96372 THER/PROPH/DIAG INJ SC/IM: CPT | Performed by: NURSE PRACTITIONER

## 2022-09-06 PROCEDURE — 99396 PREV VISIT EST AGE 40-64: CPT | Mod: 25 | Performed by: NURSE PRACTITIONER

## 2022-09-06 PROCEDURE — 88175 CYTOPATH C/V AUTO FLUID REDO: CPT

## 2022-09-06 RX ORDER — MEDROXYPROGESTERONE ACETATE 150 MG/ML
150 INJECTION, SUSPENSION INTRAMUSCULAR ONCE
Status: COMPLETED | OUTPATIENT
Start: 2022-09-06 | End: 2022-09-06

## 2022-09-06 RX ADMIN — MEDROXYPROGESTERONE ACETATE 150 MG: 150 INJECTION, SUSPENSION INTRAMUSCULAR at 10:20

## 2022-09-06 ASSESSMENT — FIBROSIS 4 INDEX: FIB4 SCORE: 0.33

## 2022-09-06 NOTE — PROGRESS NOTES
Chief Complaint   Patient presents with    Annual Exam       HPI:  Christal is a 42 y.o.  female who presents for annual exam. Generally the patient is feeling good. She has no complaints or concerns.  She is sexually active with 1 partner.  Denies STD concerns.  Regarding her health maintenance:   Last pap: 2019 - LGSIL  Abnormal Pap hx: as above.   Periods: no periods with depo  Contraception:  depo  Last Mammo:due.  Denies breast pain.  Last colonoscopy:n/a  Bone density test:N\A   Last Lab: due for follow up   Last Td:current   Influenza vaccination:current   Pneumococcal vaccination:not applicable   Hx STD''s: no   Regular exercise: yes      meds:   Current Outpatient Medications   Medication Sig Dispense Refill    venlafaxine XR (EFFEXOR XR) 75 MG CAPSULE SR 24 HR Take one daily 90 Capsule 3    venlafaxine (EFFEXOR-XR) 150 MG extended-release capsule Take 1 Capsule by mouth every day. 90 Capsule 3    rizatriptan (MAXALT-MLT) 10 MG disintegrating tablet Take 1 Tab by mouth Once PRN for Migraine for up to 1 dose. Repeat in 2 hours once if migraine persists 10 Tab 3     Current Facility-Administered Medications   Medication Dose Route Frequency Provider Last Rate Last Admin    medroxyPROGESTERone (DEPO-PROVERA) injection 150 mg  150 mg Intramuscular Once Meera Hightower, A.P.NLuis Alberto           Allergies: No Known Allergies    family:   Family History   Problem Relation Age of Onset    Heart Disease Mother         irregular heartbeat    Thyroid Sister         hypo       social hx:   Social History     Socioeconomic History    Marital status:      Spouse name: Not on file    Number of children: Not on file    Years of education: Not on file    Highest education level: Not on file   Occupational History    Not on file   Tobacco Use    Smoking status: Never    Smokeless tobacco: Never   Substance and Sexual Activity    Alcohol use: Yes     Comment: rare; on occasion     Drug use: Yes     Comment: thc    Sexual  "activity: Yes     Partners: Male     Birth control/protection: Injection     Comment: ; 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   No polydipsia, polyuria, temperature intolerance, significant weight changes   No visual changes, blurred vision.  No chest pain, palpitations, peripheral swelling   No chronic cough, shortness of breath, dyspnea with exertion.   No dysphagia, odynophagia, black or bloody stools.   No abdominal pain, nausea, persistent diarrhea, constipation   No dysuria, hematuria, incontinence. Denies nocturia  No rash, pruritis, pigment changes.   No focal weakness, syncope, headache, confusion, persistent numbness.   All other systems are reviewed and negative.    PHYSICAL EXAMINATION:  /76 (BP Location: Right arm, Patient Position: Sitting, BP Cuff Size: Large adult)   Pulse 88   Temp (!) 35.7 °C (96.3 °F)   Resp 12   Ht 1.676 m (5' 6\")   Wt 99.3 kg (219 lb)   SpO2 95%   General appearance:healthy, well developed, well nourished  Psych: alert, no distress, cooperative  Eyes: EOM's normal, pupils equal, round, reactive to light  ENT: Ears: external ears normal to inspection and palpation, TM's clear, Nose/Sinuses: nose shows no deformity, asymmetry, or inflammation  Neck: no asymmetry, masses, or scars, no adenopathy, thyroid normal to palpation  Lungs:chest symmetric with normal A/P diameter, no chest deformities noted, normal respiratory rate and rhythm  Cardiovascular:regular rate and rhythm, S1 normal  Breasts: normal in size and symmetry, skin normal, " physiologic fibronodularity  Abdomen: umbilicus normal, no masses palpable, no organomegaly  Musculoskeletal:ROM of all joints is normal, no evidence of joint instability  Lymphatic: None significantly enlarged  Skin: no rash, no edema  Neuro: mental status intact, cranial nerves 2-12 intact  Pelvic: external genitalia normal, cervix normal in appearance, bimanual exam reveals normal uterus, adnexa without masses but she does have tenderness to her left adnexal area.  Vaginal mucosa normal      ASSESSMENT/PLAN:  1.annual physical exam: HCM:  Pap and breast exams done.  BSE technique reviewed and patient encouraged to perform self-exam monthly.   Encourage monthly self breast exam  Encourage daily exercise for at least 30 minutes  Recommend mammogram by calling [phone].  Colonoscopy age 48.  Depo given today.  Discussed potential side effects such as osteoporosis.  Discussed calcium/vitamin D.  Recommend DEXA 2024.  Recommend 1500 mg Calcium with 600 units vit d daily.    Patient declined labs.  Pap smear in 3 years if today's is normal.  Recommend transvaginal ultrasound to examine any reasoning behind left adnexal tenderness.  I will follow-up with her after Pap smear and ultrasound results return.

## 2022-09-07 LAB — CYTOLOGY REG CYTOL: NORMAL

## 2022-09-19 DIAGNOSIS — F41.1 GENERALIZED ANXIETY DISORDER: ICD-10-CM

## 2022-09-19 NOTE — TELEPHONE ENCOUNTER
Received request via: Patient    Was the patient seen in the last year in this department? Yes  9/6/22  Does the patient have an active prescription (recently filled or refills available) for medication(s) requested? No

## 2022-09-20 RX ORDER — ALPRAZOLAM 0.5 MG/1
.25-.5 TABLET ORAL 2 TIMES DAILY PRN
Qty: 20 TABLET | Refills: 0 | Status: SHIPPED | OUTPATIENT
Start: 2022-09-20 | End: 2023-01-12 | Stop reason: SDUPTHER

## 2022-10-12 ENCOUNTER — HOSPITAL ENCOUNTER (OUTPATIENT)
Dept: RADIOLOGY | Facility: MEDICAL CENTER | Age: 42
End: 2022-10-12
Attending: NURSE PRACTITIONER
Payer: COMMERCIAL

## 2022-10-12 DIAGNOSIS — R10.2 ADNEXAL TENDERNESS: ICD-10-CM

## 2022-10-12 PROCEDURE — 76830 TRANSVAGINAL US NON-OB: CPT

## 2022-11-29 ENCOUNTER — NON-PROVIDER VISIT (OUTPATIENT)
Dept: MEDICAL GROUP | Facility: LAB | Age: 42
End: 2022-11-29
Payer: COMMERCIAL

## 2022-11-29 DIAGNOSIS — Z30.42 ENCOUNTER FOR SURVEILLANCE OF INJECTABLE CONTRACEPTIVE: ICD-10-CM

## 2022-11-29 PROCEDURE — 96372 THER/PROPH/DIAG INJ SC/IM: CPT | Performed by: NURSE PRACTITIONER

## 2022-11-29 RX ORDER — MEDROXYPROGESTERONE ACETATE 150 MG/ML
150 INJECTION, SUSPENSION INTRAMUSCULAR ONCE
Status: COMPLETED | OUTPATIENT
Start: 2022-11-29 | End: 2022-11-29

## 2022-11-29 RX ADMIN — MEDROXYPROGESTERONE ACETATE 150 MG: 150 INJECTION, SUSPENSION INTRAMUSCULAR at 11:04

## 2022-11-29 NOTE — PROGRESS NOTES
Hough-Maribel Roblero is a 42 y.o. here for a Depo Provera Injection.     Date of last Depo Provera Injection: 9/6/22  Current date within therapeutic range?: Yes   Urine pregnancy test done (needed if out of date range): not performed  Date of last office visit:9/6/22  Date of last pap (if > 21 years old)/ GYN exam: 9/6/22  Dx: Contraceptive use    Patient tolerated injection and no adverse effects were observed or reported: Yes    # of Administrations remaining in MAR:   Next injection due between 2/14/23 and 2/28.

## 2023-01-12 DIAGNOSIS — F41.1 GENERALIZED ANXIETY DISORDER: ICD-10-CM

## 2023-01-12 NOTE — TELEPHONE ENCOUNTER
Received request via: Patient    Was the patient seen in the last year in this department? Yes  9/6/22  Does the patient have an active prescription (recently filled or refills available) for medication(s) requested? No    Does the patient have residential Plus and need 100 day supply (blood pressure, diabetes and cholesterol meds only)? Patient does not have SCP

## 2023-01-16 RX ORDER — ALPRAZOLAM 0.5 MG/1
.25-.5 TABLET ORAL 2 TIMES DAILY PRN
Qty: 20 TABLET | Refills: 0 | Status: SHIPPED | OUTPATIENT
Start: 2023-01-16 | End: 2023-04-14 | Stop reason: SDUPTHER

## 2023-02-22 ENCOUNTER — NON-PROVIDER VISIT (OUTPATIENT)
Dept: MEDICAL GROUP | Facility: LAB | Age: 43
End: 2023-02-22
Payer: COMMERCIAL

## 2023-02-22 DIAGNOSIS — Z30.42 ENCOUNTER FOR SURVEILLANCE OF INJECTABLE CONTRACEPTIVE: ICD-10-CM

## 2023-02-22 PROCEDURE — 96372 THER/PROPH/DIAG INJ SC/IM: CPT | Performed by: NURSE PRACTITIONER

## 2023-02-22 RX ORDER — MEDROXYPROGESTERONE ACETATE 150 MG/ML
150 INJECTION, SUSPENSION INTRAMUSCULAR ONCE
Status: COMPLETED | OUTPATIENT
Start: 2023-02-22 | End: 2023-02-22

## 2023-02-22 RX ADMIN — MEDROXYPROGESTERONE ACETATE 150 MG: 150 INJECTION, SUSPENSION INTRAMUSCULAR at 11:37

## 2023-02-22 NOTE — PROGRESS NOTES
Hough-Maribel Roblero is a 42 y.o. here for a Depo Provera Injection.     Date of last Depo Provera Injection: 11/29/2022  Current date within therapeutic range?: Yes   Urine pregnancy test done (needed if out of date range): not performed  Date of last office visit:9/22/22  Date of last pap (if > 21 years old)/ GYN exam: 9/06/2022  Dx: Contraceptive use    Patient tolerated injection and no adverse effects were observed or reported: Yes    # of Administrations remaining in MAR:   Next injection due between 5/10/23 and 5/24/23.

## 2023-04-14 ENCOUNTER — PATIENT MESSAGE (OUTPATIENT)
Dept: MEDICAL GROUP | Facility: LAB | Age: 43
End: 2023-04-14
Payer: COMMERCIAL

## 2023-04-14 DIAGNOSIS — F41.1 GENERALIZED ANXIETY DISORDER: ICD-10-CM

## 2023-04-14 RX ORDER — ALPRAZOLAM 0.5 MG/1
.25-.5 TABLET ORAL 2 TIMES DAILY PRN
Qty: 15 TABLET | Refills: 0 | Status: SHIPPED | OUTPATIENT
Start: 2023-04-14 | End: 2023-05-14

## 2023-05-23 ENCOUNTER — TELEPHONE (OUTPATIENT)
Dept: MEDICAL GROUP | Facility: LAB | Age: 43
End: 2023-05-23
Payer: COMMERCIAL

## 2023-05-23 DIAGNOSIS — Z30.42 ENCOUNTER FOR SURVEILLANCE OF INJECTABLE CONTRACEPTIVE: ICD-10-CM

## 2023-05-23 RX ORDER — MEDROXYPROGESTERONE ACETATE 150 MG/ML
150 INJECTION, SUSPENSION INTRAMUSCULAR ONCE
Status: COMPLETED | OUTPATIENT
Start: 2023-05-24 | End: 2023-05-24

## 2023-05-24 ENCOUNTER — NON-PROVIDER VISIT (OUTPATIENT)
Dept: MEDICAL GROUP | Facility: LAB | Age: 43
End: 2023-05-24
Payer: COMMERCIAL

## 2023-05-24 PROCEDURE — 96372 THER/PROPH/DIAG INJ SC/IM: CPT | Performed by: NURSE PRACTITIONER

## 2023-05-24 RX ADMIN — MEDROXYPROGESTERONE ACETATE 150 MG: 150 INJECTION, SUSPENSION INTRAMUSCULAR at 08:31

## 2023-05-24 NOTE — PROGRESS NOTES
Hough-Maribel Roblero is a 42 y.o. here for a Depo Provera Injection.     Date of last Depo Provera Injection: 2/22/2023  Current date within therapeutic range?: Yes   Urine pregnancy test done (needed if out of date range): not performed  Date of last office visit:9/6/2022  Date of last pap (if > 21 years old)/ GYN exam: 9/6/2022  Dx: Contraceptive use    Patient tolerated injection and no adverse effects were observed or reported: Yes    # of Administrations remaining in MAR: 0  Next injection due between August 9 and August 23.

## 2023-07-05 ENCOUNTER — OFFICE VISIT (OUTPATIENT)
Dept: MEDICAL GROUP | Facility: LAB | Age: 43
End: 2023-07-05
Payer: COMMERCIAL

## 2023-07-05 VITALS
HEIGHT: 66 IN | SYSTOLIC BLOOD PRESSURE: 124 MMHG | BODY MASS INDEX: 35.52 KG/M2 | OXYGEN SATURATION: 97 % | TEMPERATURE: 97.1 F | WEIGHT: 221 LBS | HEART RATE: 92 BPM | DIASTOLIC BLOOD PRESSURE: 86 MMHG | RESPIRATION RATE: 12 BRPM

## 2023-07-05 DIAGNOSIS — F41.1 GAD (GENERALIZED ANXIETY DISORDER): ICD-10-CM

## 2023-07-05 PROCEDURE — 99213 OFFICE O/P EST LOW 20 MIN: CPT | Performed by: NURSE PRACTITIONER

## 2023-07-05 PROCEDURE — 3079F DIAST BP 80-89 MM HG: CPT | Performed by: NURSE PRACTITIONER

## 2023-07-05 PROCEDURE — 3074F SYST BP LT 130 MM HG: CPT | Performed by: NURSE PRACTITIONER

## 2023-07-05 RX ORDER — ALPRAZOLAM 0.5 MG/1
0.5 TABLET ORAL
Qty: 15 TABLET | Refills: 1 | Status: SHIPPED | OUTPATIENT
Start: 2023-07-05 | End: 2023-08-04

## 2023-07-05 NOTE — PROGRESS NOTES
"CC  Anxiety f/u       HPI:  43-year-old established female here with complaint of worsening on chronic anxiety.  She has been suffering from worsening anxiety for the past few months.  Currently taking 225 mg of venlafaxine and has for many years.  Was previously on sertraline in 2014 which worked for about 5 years and then stopped working as well.  Cannot recall what happened with Prozac.  Has alprazolam to use for panic attacks.    Taking aleve pm which helps her sleep.   Appetite normal.    Panic attack:  feels that she can't breath.  Xanax / deep breathing helps.    Work is very busy / stressful.    Denies major depression, suicidal or homicidal ideations.    Exam:  /86 (BP Location: Left arm, Patient Position: Sitting, BP Cuff Size: Adult)   Pulse 92   Temp 36.2 °C (97.1 °F)   Resp 12   Ht 1.676 m (5' 6\")   Wt 100 kg (221 lb)   SpO2 97%   Gen. appears healthy in no distress   Skin appropriate for sex and age   Neck trachea is midline  Lungs unlabored breathing  Heart regular rate  Neuro gait and station normal   Psych appropriate, calm, interactive, well-groomed      A/P:  \"  1. ANN (generalized anxiety disorder)  sertraline (ZOLOFT) 50 MG Tab    ALPRAZolam (XANAX) 0.5 MG Tab      \"  Continue venlafaxine 225 mg every morning and add in sertraline 50 mg for the next 2 weeks, increasing to 100 mg after 2 weeks if tolerated well.  Discussed length of onset efficacy as well as potential side effects of sertraline.  She prefers to follow-up with me via [facility] and will do so in about 4 to 6 weeks, sooner with any problems or concerns.  May continue to use Xanax as needed for panic or anxiety attacks.  Encouraged exercise, limit caffeine intake and consideration of seeing a therapist.    Side effects of all medications prescribed today were discussed with the patient including how to take the medications and proper dosage. Discussed repercussions of not taking the medications as prescribed. Instructed to " call the office should she have any negative side effects or problems with the medications.    She understands that she should not take Xanax within 6 hours of driving a car, operating machinery or drinking alcohol.  She understands the chemically dependent nature of Xanax.

## 2023-08-09 ENCOUNTER — PATIENT MESSAGE (OUTPATIENT)
Dept: MEDICAL GROUP | Facility: LAB | Age: 43
End: 2023-08-09
Payer: COMMERCIAL

## 2023-08-10 RX ORDER — FLUOXETINE HYDROCHLORIDE 20 MG/1
20 CAPSULE ORAL DAILY
Qty: 30 CAPSULE | Refills: 5 | Status: SHIPPED | OUTPATIENT
Start: 2023-08-10 | End: 2024-01-31

## 2023-08-11 DIAGNOSIS — F41.1 GENERALIZED ANXIETY DISORDER: ICD-10-CM

## 2023-08-11 RX ORDER — VENLAFAXINE HYDROCHLORIDE 75 MG/1
CAPSULE, EXTENDED RELEASE ORAL
Qty: 90 CAPSULE | Refills: 0 | Status: SHIPPED | OUTPATIENT
Start: 2023-08-11 | End: 2023-11-16

## 2023-08-11 RX ORDER — VENLAFAXINE HYDROCHLORIDE 150 MG/1
150 CAPSULE, EXTENDED RELEASE ORAL DAILY
Qty: 90 CAPSULE | Refills: 0 | Status: SHIPPED | OUTPATIENT
Start: 2023-08-11 | End: 2023-11-16

## 2023-08-11 NOTE — TELEPHONE ENCOUNTER
Received request via: Pharmacy    Was the patient seen in the last year in this department? Yes  7/5/23  Does the patient have an active prescription (recently filled or refills available) for medication(s) requested? No    Does the patient have alf Plus and need 100 day supply (blood pressure, diabetes and cholesterol meds only)? Medication is not for cholesterol, blood pressure or diabetes

## 2023-08-23 ENCOUNTER — NON-PROVIDER VISIT (OUTPATIENT)
Dept: MEDICAL GROUP | Facility: LAB | Age: 43
End: 2023-08-23
Payer: COMMERCIAL

## 2023-08-23 DIAGNOSIS — Z30.42 ENCOUNTER FOR SURVEILLANCE OF INJECTABLE CONTRACEPTIVE: ICD-10-CM

## 2023-08-23 PROCEDURE — 96372 THER/PROPH/DIAG INJ SC/IM: CPT | Performed by: NURSE PRACTITIONER

## 2023-08-23 RX ORDER — MEDROXYPROGESTERONE ACETATE 150 MG/ML
150 INJECTION, SUSPENSION INTRAMUSCULAR ONCE
Status: COMPLETED | OUTPATIENT
Start: 2023-08-23 | End: 2023-08-23

## 2023-08-23 RX ADMIN — MEDROXYPROGESTERONE ACETATE 150 MG: 150 INJECTION, SUSPENSION INTRAMUSCULAR at 09:32

## 2023-08-23 NOTE — PROGRESS NOTES
Hough-Maribel Roblero is a 43 y.o. here for a Depo Provera Injection.     Date of last Depo Provera Injection: 5/24/23  Current date within therapeutic range?: Yes   Urine pregnancy test done (needed if out of date range): not performed  Date of last office visit:7/5/23  Date of last pap (if > 21 years old)/ GYN exam: 9/6/22  Dx: Contraceptive use    Patient tolerated injection and no adverse effects were observed or reported: Yes    # of Administrations remaining in MAR:  Next injection due between 11/8/23 and 11/22/23.

## 2023-11-08 ENCOUNTER — TELEPHONE (OUTPATIENT)
Dept: MEDICAL GROUP | Facility: LAB | Age: 43
End: 2023-11-08

## 2023-11-08 ENCOUNTER — NON-PROVIDER VISIT (OUTPATIENT)
Dept: MEDICAL GROUP | Facility: LAB | Age: 43
End: 2023-11-08
Payer: COMMERCIAL

## 2023-11-08 ENCOUNTER — APPOINTMENT (OUTPATIENT)
Dept: MEDICAL GROUP | Facility: LAB | Age: 43
End: 2023-11-08
Payer: COMMERCIAL

## 2023-11-08 DIAGNOSIS — Z30.42 ENCOUNTER FOR SURVEILLANCE OF INJECTABLE CONTRACEPTIVE: ICD-10-CM

## 2023-11-08 PROCEDURE — 96372 THER/PROPH/DIAG INJ SC/IM: CPT | Performed by: NURSE PRACTITIONER

## 2023-11-08 RX ORDER — MEDROXYPROGESTERONE ACETATE 150 MG/ML
150 INJECTION, SUSPENSION INTRAMUSCULAR ONCE
Status: COMPLETED | OUTPATIENT
Start: 2023-11-08 | End: 2023-11-08

## 2023-11-08 RX ADMIN — MEDROXYPROGESTERONE ACETATE 150 MG: 150 INJECTION, SUSPENSION INTRAMUSCULAR at 10:50

## 2023-11-16 DIAGNOSIS — F41.1 GENERALIZED ANXIETY DISORDER: ICD-10-CM

## 2023-11-16 RX ORDER — VENLAFAXINE HYDROCHLORIDE 75 MG/1
CAPSULE, EXTENDED RELEASE ORAL
Qty: 90 CAPSULE | Refills: 0 | Status: SHIPPED | OUTPATIENT
Start: 2023-11-16 | End: 2024-02-15

## 2023-11-16 RX ORDER — VENLAFAXINE HYDROCHLORIDE 150 MG/1
150 CAPSULE, EXTENDED RELEASE ORAL DAILY
Qty: 90 CAPSULE | Refills: 0 | Status: SHIPPED | OUTPATIENT
Start: 2023-11-16 | End: 2024-02-15

## 2024-01-31 RX ORDER — FLUOXETINE HYDROCHLORIDE 20 MG/1
20 CAPSULE ORAL DAILY
Qty: 30 CAPSULE | Refills: 0 | Status: SHIPPED | OUTPATIENT
Start: 2024-01-31

## 2024-01-31 NOTE — TELEPHONE ENCOUNTER
Received request via: Pharmacy    Was the patient seen in the last year in this department? Yes  7/5/23  Does the patient have an active prescription (recently filled or refills available) for medication(s) requested? No    Pharmacy Name: [facility]    Does the patient have [facility] and need 100 day supply (blood pressure, diabetes and cholesterol meds only)? Medication is not for cholesterol, blood pressure or diabetes

## 2024-02-15 DIAGNOSIS — F41.1 GENERALIZED ANXIETY DISORDER: ICD-10-CM

## 2024-02-15 RX ORDER — VENLAFAXINE HYDROCHLORIDE 150 MG/1
150 CAPSULE, EXTENDED RELEASE ORAL DAILY
Qty: 90 CAPSULE | Refills: 0 | Status: SHIPPED | OUTPATIENT
Start: 2024-02-15

## 2024-02-15 RX ORDER — VENLAFAXINE HYDROCHLORIDE 75 MG/1
CAPSULE, EXTENDED RELEASE ORAL
Qty: 90 CAPSULE | Refills: 0 | Status: SHIPPED | OUTPATIENT
Start: 2024-02-15

## 2024-02-16 ENCOUNTER — NON-PROVIDER VISIT (OUTPATIENT)
Dept: MEDICAL GROUP | Facility: LAB | Age: 44
End: 2024-02-16
Payer: COMMERCIAL

## 2024-02-16 DIAGNOSIS — Z30.42 ENCOUNTER FOR SURVEILLANCE OF INJECTABLE CONTRACEPTIVE: ICD-10-CM

## 2024-02-16 LAB
POCT INT CON NEG: NEGATIVE
POCT INT CON POS: POSITIVE
POCT URINE PREGNANCY TEST: NEGATIVE

## 2024-02-16 PROCEDURE — 96372 THER/PROPH/DIAG INJ SC/IM: CPT | Performed by: FAMILY MEDICINE

## 2024-02-16 PROCEDURE — 81025 URINE PREGNANCY TEST: CPT | Performed by: NURSE PRACTITIONER

## 2024-02-16 RX ORDER — MEDROXYPROGESTERONE ACETATE 150 MG/ML
150 INJECTION, SUSPENSION INTRAMUSCULAR ONCE
Status: COMPLETED | OUTPATIENT
Start: 2024-02-16 | End: 2024-02-16

## 2024-02-16 RX ADMIN — MEDROXYPROGESTERONE ACETATE 150 MG: 150 INJECTION, SUSPENSION INTRAMUSCULAR at 10:30

## 2024-02-16 NOTE — PROGRESS NOTES
Hough-Maribel Roblero is a 43 y.o. here for a Depo Provera Injection.     Date of last Depo Provera Injection: 11/08/23  Current date within therapeutic range?: No   Urine pregnancy test done (needed if out of date range): yes--negative  Date of last office visit:07/05/23  Date of last pap (if > 21 years old)/ GYN exam: 09/06/22  Dx: Family planning    Patient tolerated injection and no adverse effects were observed or reported: Yes    # of Administrations remaining in MAR: 0  Next injection due between 05/04/24 and 05/18/24.

## 2024-03-18 ENCOUNTER — OFFICE VISIT (OUTPATIENT)
Dept: URGENT CARE | Facility: CLINIC | Age: 44
End: 2024-03-18
Payer: COMMERCIAL

## 2024-03-18 ENCOUNTER — APPOINTMENT (OUTPATIENT)
Dept: RADIOLOGY | Facility: IMAGING CENTER | Age: 44
End: 2024-03-18
Attending: PHYSICIAN ASSISTANT
Payer: COMMERCIAL

## 2024-03-18 VITALS
TEMPERATURE: 98 F | WEIGHT: 211 LBS | HEART RATE: 108 BPM | OXYGEN SATURATION: 98 % | RESPIRATION RATE: 20 BRPM | BODY MASS INDEX: 33.91 KG/M2 | DIASTOLIC BLOOD PRESSURE: 80 MMHG | HEIGHT: 66 IN | SYSTOLIC BLOOD PRESSURE: 125 MMHG

## 2024-03-18 DIAGNOSIS — R07.81 RIB PAIN ON RIGHT SIDE: ICD-10-CM

## 2024-03-18 DIAGNOSIS — R06.02 SHORTNESS OF BREATH: ICD-10-CM

## 2024-03-18 LAB
APPEARANCE UR: CLEAR
BILIRUB UR STRIP-MCNC: NEGATIVE MG/DL
COLOR UR AUTO: YELLOW
GLUCOSE UR STRIP.AUTO-MCNC: NEGATIVE MG/DL
KETONES UR STRIP.AUTO-MCNC: NEGATIVE MG/DL
LEUKOCYTE ESTERASE UR QL STRIP.AUTO: NORMAL
NITRITE UR QL STRIP.AUTO: NEGATIVE
PH UR STRIP.AUTO: 6.5 [PH] (ref 5–8)
PROT UR QL STRIP: NEGATIVE MG/DL
RBC UR QL AUTO: NEGATIVE
SP GR UR STRIP.AUTO: 1.02
UROBILINOGEN UR STRIP-MCNC: 0.2 MG/DL

## 2024-03-18 PROCEDURE — 71101 X-RAY EXAM UNILAT RIBS/CHEST: CPT | Mod: TC,FY,RT | Performed by: RADIOLOGY

## 2024-03-18 PROCEDURE — 3079F DIAST BP 80-89 MM HG: CPT | Performed by: PHYSICIAN ASSISTANT

## 2024-03-18 PROCEDURE — 3074F SYST BP LT 130 MM HG: CPT | Performed by: PHYSICIAN ASSISTANT

## 2024-03-18 PROCEDURE — 81002 URINALYSIS NONAUTO W/O SCOPE: CPT | Performed by: PHYSICIAN ASSISTANT

## 2024-03-18 PROCEDURE — 99213 OFFICE O/P EST LOW 20 MIN: CPT | Mod: 25 | Performed by: PHYSICIAN ASSISTANT

## 2024-03-18 RX ORDER — KETOROLAC TROMETHAMINE 30 MG/ML
15 INJECTION, SOLUTION INTRAMUSCULAR; INTRAVENOUS ONCE
Status: COMPLETED | OUTPATIENT
Start: 2024-03-18 | End: 2024-03-18

## 2024-03-18 RX ORDER — CYCLOBENZAPRINE HCL 5 MG
5-10 TABLET ORAL 3 TIMES DAILY PRN
Qty: 20 TABLET | Refills: 0 | Status: SHIPPED | OUTPATIENT
Start: 2024-03-18

## 2024-03-18 RX ORDER — LIDOCAINE 50 MG/G
1 PATCH TOPICAL EVERY 24 HOURS
Qty: 10 PATCH | Refills: 0 | Status: SHIPPED | OUTPATIENT
Start: 2024-03-18

## 2024-03-18 RX ADMIN — KETOROLAC TROMETHAMINE 15 MG: 30 INJECTION, SOLUTION INTRAMUSCULAR; INTRAVENOUS at 14:52

## 2024-03-18 NOTE — PROGRESS NOTES
"Subjective:   Christal Roblero is a 43 y.o. female who presents for Rib Injury (R SIDE pain 7 days)     One week ago twisted and coughed and felt severe pain in the left lateral side.  Was reaching and coughing simultaneously at onset of pain  Feels SOB and pain with inspiration  Pain lying down.    No uri sxs  No improvement over the week  Tried rest over the weekend.  Does have history of low back pain.  Taking Ibu, gabapentin  Denies hemoptysis or shortness of breath.        Medications:  FLUoxetine Caps  rizatriptan  venlafaxine  venlafaxine XR Cp24    Allergies:             [place] (diagnostic) and Nkda [no known drug allergy]    Surgical History:         Past Surgical History:   Procedure Laterality Date    DILATION AND CURETTAGE N/A 7/26/2019    Procedure: DILATION AND EVACUATION 1ST TRIMESTER;  Surgeon: Sarah Garcia M.D.;  Location: SURGERY [facility];  Service: Gynecology       Past Social Hx:  Christal Roblero  reports that she has never smoked. She has never used smokeless tobacco. She reports current alcohol use. She reports current drug use.     Past Family Hx:   Christal Roblero family history includes Heart Disease in her mother; Thyroid in her sister.       Problem list, medications, and allergies reviewed by myself today in Epic.     Objective:     /80   Pulse (!) 108   Temp 36.7 °C (98 °F) (Temporal)   Resp 20   Ht 1.676 m (5' 6\")   Wt 95.7 kg (211 lb)   SpO2 98%   BMI 34.06 kg/m²     Physical Exam  Chest:          Comments: Focal tenderness to palpation to the right anterolateral chest wall.  No bony step-off or deformity.  No crepitus.  Lungs clear to auscultation bilaterally.  No decreased breath sounds.  No overlying skin color or temperature changes        RADIOLOGY RESULTS   [id number] (WITH 1-VIEW CXR) RIGHT    Result Date: 3/18/2024  3/18/2024 2:10 PM HISTORY/REASON FOR EXAM:  Shortness of Breath; right sided chest wall pain. TECHNIQUE/EXAM DESCRIPTION " AND NUMBER OF VIEWS:  3 images of the right ribs and chest. COMPARISON: NONE FINDINGS: No fractures or acute bony changes are noted.  There is no evidence of a hemo or pneumothorax.     Normal rib series.           Assessment/Plan:     Diagnosis and Associated Orders:     1. Rib pain on right side  - [id number] (WITH 1-VIEW CXR) RIGHT; Future  - ketorolac (Toradol) injection 15 mg  - lidocaine (LIDODERM) 5 % Patch; Place 1 Patch on the skin every 24 hours.  Dispense: 10 Patch; Refill: 0  - cyclobenzaprine (FLEXERIL) 5 mg tablet; Take 1-2 Tablets by mouth 3 times a day as needed for Muscle Spasms.  Dispense: 20 Tablet; Refill: 0  - POCT Urinalysis    2. Shortness of breath  - lidocaine (LIDODERM) 5 % Patch; Place 1 Patch on the skin every 24 hours.  Dispense: 10 Patch; Refill: 0        Comments/MDM:  No radiographic evidence of bony abnormality or pneumonia.  Suspect intercostal muscle strain.  -Rest and Ice   -Ice 15 to 20 minutes every two to three hours for the first 48 hours for swelling and pain.  -Try not to avoid taking full respirations, breathe normal.  Encourage deep breathing exercises to avoid atelectasis/pneumonia.  Avoid splinting for pain.  -NSAIDs (Ibuprofen) for pain and inflammation.   -Can also take OTC Tylenol as directed for pain.   -May use topical analgesics such as Voltaren or Salonpas     Follow up for persistent, new, or worsening pain. Follow up emergently for difficulty breathing, severe uncontrolled pain, coughing up blood, elevated heart rate, abdominal pain or swelling, fever, weakness or dizziness. Follow up with Primary Care Provider. Most rib fractures heal within six weeks. Pain from rib fractures can be severe for several days following the injury.     I personally reviewed prior external notes and test results pertinent to today's visit. Supportive care, natural history, differential diagnoses, and indications for immediate follow-up discussed. Return to clinic or go to ED  if symptoms worsen or persist.  Red flag symptoms discussed.  Patient/Parent/Guardian voices understanding. Follow-up with your primary care provider in 3-5 days.  All side effects of medication discussed including allergic response, GI upset, tendon injury, rash, sedation etc    Please note that this dictation was created using voice recognition software. I have made a reasonable attempt to correct obvious errors, but I expect that there are errors of grammar and possibly content that I did not discover before finalizing the note.    This note was electronically signed by Dinora Yates PA-C

## 2024-03-19 ENCOUNTER — HOSPITAL ENCOUNTER (EMERGENCY)
Facility: MEDICAL CENTER | Age: 44
End: 2024-03-19
Attending: EMERGENCY MEDICINE
Payer: COMMERCIAL

## 2024-03-19 VITALS
HEART RATE: 81 BPM | HEIGHT: 66 IN | BODY MASS INDEX: 34.58 KG/M2 | RESPIRATION RATE: 20 BRPM | DIASTOLIC BLOOD PRESSURE: 99 MMHG | SYSTOLIC BLOOD PRESSURE: 126 MMHG | WEIGHT: 215.17 LBS | TEMPERATURE: 97.6 F | OXYGEN SATURATION: 96 %

## 2024-03-19 DIAGNOSIS — R10.9 RIGHT FLANK PAIN: ICD-10-CM

## 2024-03-19 DIAGNOSIS — R07.89 CHEST WALL PAIN: ICD-10-CM

## 2024-03-19 LAB
ALBUMIN SERPL BCP-MCNC: 4.1 G/DL (ref 3.2–4.9)
ALBUMIN/GLOB SERPL: 1.5 G/DL
ALP SERPL-CCNC: 64 U/L (ref 30–99)
ALT SERPL-CCNC: 15 U/L (ref 2–50)
ANION GAP SERPL CALC-SCNC: 9 MMOL/L (ref 7–16)
AST SERPL-CCNC: 12 U/L (ref 12–45)
BASOPHILS # BLD AUTO: 0.6 % (ref 0–1.8)
BASOPHILS # BLD: 0.06 K/UL (ref 0–0.12)
BILIRUB SERPL-MCNC: 0.4 MG/DL (ref 0.1–1.5)
BUN SERPL-MCNC: 17 MG/DL (ref 8–22)
CALCIUM ALBUM COR SERPL-MCNC: 9 MG/DL (ref 8.5–10.5)
CALCIUM SERPL-MCNC: 9.1 MG/DL (ref 8.4–10.2)
CHLORIDE SERPL-SCNC: 106 MMOL/L (ref 96–112)
CO2 SERPL-SCNC: 27 MMOL/L (ref 20–33)
CREAT SERPL-MCNC: 0.85 MG/DL (ref 0.5–1.4)
D DIMER PPP IA.FEU-MCNC: <0.27 UG/ML (FEU) (ref 0–0.5)
EOSINOPHIL # BLD AUTO: 0.17 K/UL (ref 0–0.51)
EOSINOPHIL NFR BLD: 1.8 % (ref 0–6.9)
ERYTHROCYTE [DISTWIDTH] IN BLOOD BY AUTOMATED COUNT: 43.6 FL (ref 35.9–50)
GFR SERPLBLD CREATININE-BSD FMLA CKD-EPI: 86 ML/MIN/1.73 M 2
GLOBULIN SER CALC-MCNC: 2.8 G/DL (ref 1.9–3.5)
GLUCOSE SERPL-MCNC: 99 MG/DL (ref 65–99)
HCT VFR BLD AUTO: 44.2 % (ref 37–47)
HGB BLD-MCNC: 14.8 G/DL (ref 12–16)
IMM GRANULOCYTES # BLD AUTO: 0.04 K/UL (ref 0–0.11)
IMM GRANULOCYTES NFR BLD AUTO: 0.4 % (ref 0–0.9)
LIPASE SERPL-CCNC: 38 U/L (ref 11–82)
LYMPHOCYTES # BLD AUTO: 2.37 K/UL (ref 1–4.8)
LYMPHOCYTES NFR BLD: 25.2 % (ref 22–41)
MCH RBC QN AUTO: 29.8 PG (ref 27–33)
MCHC RBC AUTO-ENTMCNC: 33.5 G/DL (ref 32.2–35.5)
MCV RBC AUTO: 89.1 FL (ref 81.4–97.8)
MONOCYTES # BLD AUTO: 0.69 K/UL (ref 0–0.85)
MONOCYTES NFR BLD AUTO: 7.3 % (ref 0–13.4)
NEUTROPHILS # BLD AUTO: 6.09 K/UL (ref 1.82–7.42)
NEUTROPHILS NFR BLD: 64.7 % (ref 44–72)
NRBC # BLD AUTO: 0 K/UL
NRBC BLD-RTO: 0 /100 WBC (ref 0–0.2)
PLATELET # BLD AUTO: 298 K/UL (ref 164–446)
PMV BLD AUTO: 8.9 FL (ref 9–12.9)
POTASSIUM SERPL-SCNC: 4.9 MMOL/L (ref 3.6–5.5)
PROT SERPL-MCNC: 6.9 G/DL (ref 6–8.2)
RBC # BLD AUTO: 4.96 M/UL (ref 4.2–5.4)
SODIUM SERPL-SCNC: 142 MMOL/L (ref 135–145)
WBC # BLD AUTO: 9.4 K/UL (ref 4.8–10.8)

## 2024-03-19 PROCEDURE — 83690 ASSAY OF LIPASE: CPT

## 2024-03-19 PROCEDURE — 96374 THER/PROPH/DIAG INJ IV PUSH: CPT

## 2024-03-19 PROCEDURE — 85379 FIBRIN DEGRADATION QUANT: CPT

## 2024-03-19 PROCEDURE — 96375 TX/PRO/DX INJ NEW DRUG ADDON: CPT

## 2024-03-19 PROCEDURE — 99284 EMERGENCY DEPT VISIT MOD MDM: CPT

## 2024-03-19 PROCEDURE — A9270 NON-COVERED ITEM OR SERVICE: HCPCS | Performed by: EMERGENCY MEDICINE

## 2024-03-19 PROCEDURE — 700111 HCHG RX REV CODE 636 W/ 250 OVERRIDE (IP): Mod: JZ | Performed by: EMERGENCY MEDICINE

## 2024-03-19 PROCEDURE — 80053 COMPREHEN METABOLIC PANEL: CPT

## 2024-03-19 PROCEDURE — 700102 HCHG RX REV CODE 250 W/ 637 OVERRIDE(OP): Performed by: EMERGENCY MEDICINE

## 2024-03-19 PROCEDURE — 85025 COMPLETE CBC W/AUTO DIFF WBC: CPT

## 2024-03-19 PROCEDURE — 94760 N-INVAS EAR/PLS OXIMETRY 1: CPT

## 2024-03-19 PROCEDURE — 36415 COLL VENOUS BLD VENIPUNCTURE: CPT

## 2024-03-19 RX ORDER — OXYCODONE HYDROCHLORIDE 5 MG/1
5 TABLET ORAL EVERY 6 HOURS PRN
Qty: 10 TABLET | Refills: 0 | Status: SHIPPED | OUTPATIENT
Start: 2024-03-19 | End: 2024-03-22

## 2024-03-19 RX ORDER — METHOCARBAMOL 500 MG/1
500 TABLET, FILM COATED ORAL ONCE
Status: COMPLETED | OUTPATIENT
Start: 2024-03-19 | End: 2024-03-19

## 2024-03-19 RX ORDER — OXYCODONE HYDROCHLORIDE 5 MG/1
5 TABLET ORAL ONCE
Status: COMPLETED | OUTPATIENT
Start: 2024-03-19 | End: 2024-03-19

## 2024-03-19 RX ORDER — KETOROLAC TROMETHAMINE 15 MG/ML
15 INJECTION, SOLUTION INTRAMUSCULAR; INTRAVENOUS ONCE
Status: COMPLETED | OUTPATIENT
Start: 2024-03-19 | End: 2024-03-19

## 2024-03-19 RX ADMIN — FENTANYL CITRATE 50 MCG: 50 INJECTION, SOLUTION INTRAMUSCULAR; INTRAVENOUS at 08:24

## 2024-03-19 RX ADMIN — METHOCARBAMOL 500 MG: 500 TABLET ORAL at 08:19

## 2024-03-19 RX ADMIN — OXYCODONE 5 MG: 5 TABLET ORAL at 09:45

## 2024-03-19 RX ADMIN — KETOROLAC TROMETHAMINE 15 MG: 15 INJECTION, SOLUTION INTRAMUSCULAR; INTRAVENOUS at 08:24

## 2024-03-19 NOTE — ED PROVIDER NOTES
ED Provider Note    CHIEF COMPLAINT  Chief Complaint   Patient presents with    Rib Pain     Pt c/o Rt sided rib pain that hurts all the way to her spine, started last Tuesday, denied having any trauma, Pt stated that its hard to take deep breaths; Pt stated that she went to  yesterday and rib xrays were negative for any Fx's;       EXTERNAL RECORDS REVIEWED  Outpatient Notes UC visit yesterday 3/18/2024 for right side pain x 7 days, rib injury.  Rib injury not otherwise specified.  Return instructions discussed.  Details of intervention and discharge medications not available.  Imaging reviewed: 3/18/2024 rib series unremarkable    HPI/ROS  LIMITATION TO HISTORY   Select: : None  OUTSIDE HISTORIAN(S):  None    Christal Roblero is a 43 y.o. female who presents to the emergency department through triage for right-sided chest pain.  Patient states that she has a chronic nonproductive cough and last week she was in a turned or twisted position and coughed and felt immediate pain and pull in her right lateral and posterior chest wall.  Pain has been intractable since that time.  Worse with inspiration, movement.  Other than chronic nonproductive cough, denies congestion, sore throat, sputum production.  Denies rash.  Denies fall or other trauma.  Denies palpitations, syncope.  Denies fever or chills.    Seen at urgent care yesterday, states rib x-rays were normal.  Muscle relaxer without relief last night.  Patient states she has been taking naproxen and Motrin 800 mg, also without relief.    PAST MEDICAL HISTORY   has a past medical history of Allergy, Anxiety, Gout, Headache(784.0), History of unintended awareness under general anesthesia (7/26/2019), and Migraine.    SURGICAL HISTORY   has a past surgical history that includes dilation and curettage (N/A, 7/26/2019).    FAMILY HISTORY  Family History   Problem Relation Age of Onset    Heart Disease Mother         irregular heartbeat    Thyroid Sister          "hypo       SOCIAL HISTORY  Social History     Tobacco Use    Smoking status: Never    Smokeless tobacco: Never   Substance and Sexual Activity    Alcohol use: Yes     Comment: rare; on occasion     Drug use: Yes     Comment: thc    Sexual activity: Yes     Partners: Male     Birth control/protection: Injection     Comment: ; two kids; wk: 2 jobs ([employer] / [email or web address])       CURRENT MEDICATIONS  Home Medications       Reviewed by Chad Sarmiento R.N. (Registered Nurse) on 03/19/24 at 0720  Med List Status: Not Addressed     Medication Last Dose Status   cyclobenzaprine (FLEXERIL) 5 mg tablet  Active   FLUoxetine (PROZAC) 20 MG Cap  Active   lidocaine (LIDODERM) 5 % Patch  Active   rizatriptan (MAXALT-MLT) 10 MG disintegrating tablet  Active   venlafaxine (EFFEXOR-XR) 150 MG extended-release capsule  Active   venlafaxine XR (EFFEXOR XR) 75 MG CAPSULE SR 24 HR  Active                    ALLERGIES  Allergies   Allergen Reactions    [place] (Diagnostic) Itching     Throat Itchiness    Nkda [No Known Drug Allergy] Itching     Fruit and Vegetables (Throat Itchiness)         PHYSICAL EXAM  VITAL SIGNS: BP (!) 140/83   Pulse 75   Temp 36.3 °C (97.3 °F) (Temporal)   Resp 20   Ht 1.676 m (5' 6\")   Wt 97.6 kg (215 lb 2.7 oz)   SpO2 94%   BMI 34.73 kg/m²    Pulse ox interpretation: I interpret this pulse ox as normal.  Constitutional: Alert in no apparent distress.  HENT: Normocephalic, atraumatic. Bilateral external ears normal, Nose normal.   Eyes: Pupils are equal and reactive, Conjunctiva normal.   Neck: Normal range of motion, Supple  Lymphatic: No lymphadenopathy noted.   Cardiovascular: Regular rate and rhythm, no murmurs. Distal pulses intact.  No peripheral edema.  Thorax & Lungs: Normal breath sounds.  No wheezing/rales/ronchi.  Somewhat splinted inspirations due to discomfort otherwise noincreased work of breathing, clipped speech or retractions.  Reproducible discomfort right posterior lateral rib " angles 7-8.  No hematoma, crepitus.  No cutaneous changes.  Abdomen: Soft, non-distended, non-tender to palpation. No palpable or pulsatile masses. No peritoneal signs. No CVA tenderness.  Skin: Warm, Dry, No erythema, No rash.   Musculoskeletal: Good range of motion in all major joints.   Neurologic: Alert and orient x 4.  Speech clear and cohesive.  Moves 4 extremity spontaneously.  Psychiatric: Affect normal, Judgment normal, Mood normal.       DIAGNOSTIC STUDIES / PROCEDURES    LABS  Results for orders placed or performed during the hospital encounter of 03/19/24   CBC w/ Differential   Result Value Ref Range    WBC 9.4 4.8 - 10.8 K/uL    RBC 4.96 4.20 - 5.40 M/uL    Hemoglobin 14.8 12.0 - 16.0 g/dL    Hematocrit 44.2 37.0 - 47.0 %    MCV 89.1 81.4 - 97.8 fL    MCH 29.8 27.0 - 33.0 pg    MCHC 33.5 32.2 - 35.5 g/dL    RDW 43.6 35.9 - 50.0 fL    Platelet Count 298 164 - 446 K/uL    MPV 8.9 (L) 9.0 - 12.9 fL    Neutrophils-Polys 64.70 44.00 - 72.00 %    Lymphocytes 25.20 22.00 - 41.00 %    Monocytes 7.30 0.00 - 13.40 %    Eosinophils 1.80 0.00 - 6.90 %    Basophils 0.60 0.00 - 1.80 %    Immature Granulocytes 0.40 0.00 - 0.90 %    Nucleated RBC 0.00 0.00 - 0.20 /100 WBC    Neutrophils (Absolute) 6.09 1.82 - 7.42 K/uL    Lymphs (Absolute) 2.37 1.00 - 4.80 K/uL    Monos (Absolute) 0.69 0.00 - 0.85 K/uL    Eos (Absolute) 0.17 0.00 - 0.51 K/uL    Baso (Absolute) 0.06 0.00 - 0.12 K/uL    Immature Granulocytes (abs) 0.04 0.00 - 0.11 K/uL    NRBC (Absolute) 0.00 K/uL   Complete Metabolic Panel (CMP)   Result Value Ref Range    Sodium 142 135 - 145 mmol/L    Potassium 4.9 3.6 - 5.5 mmol/L    Chloride 106 96 - 112 mmol/L    Co2 27 20 - 33 mmol/L    Anion Gap 9.0 7.0 - 16.0    Glucose 99 65 - 99 mg/dL    Bun 17 8 - 22 mg/dL    Creatinine 0.85 0.50 - 1.40 mg/dL    Calcium 9.1 8.4 - 10.2 mg/dL    Correct Calcium 9.0 8.5 - 10.5 mg/dL    AST(SGOT) 12 12 - 45 U/L    ALT(SGPT) 15 2 - 50 U/L    Alkaline Phosphatase 64 30 - 99 U/L     Total Bilirubin 0.4 0.1 - 1.5 mg/dL    Albumin 4.1 3.2 - 4.9 g/dL    Total Protein 6.9 6.0 - 8.2 g/dL    Globulin 2.8 1.9 - 3.5 g/dL    A-G Ratio 1.5 g/dL   Lipase   Result Value Ref Range    Lipase 38 11 - 82 U/L   D-Dimer (only helpful in low pre-test probability wells critieria. Do not order if patient ruled out by PERC criteria. See Weblinks at top of Labs section)   Result Value Ref Range    D-Dimer <0.27 0.00 - 0.50 ug/mL (FEU)   ESTIMATED GFR   Result Value Ref Range    GFR (CKD-EPI) 86 >60 mL/min/1.73 m 2       COURSE & MEDICAL DECISION MAKING    ED Observation Status? No; Patient does not meet criteria for ED Observation.     INITIAL ASSESSMENT, COURSE AND PLAN  Care Narrative:   Seen evaluated at bedside.  Right-sided chest wall pain, reproducible with palpation.  No cutaneous changes.  Rib series yesterday which includes 1 view chest was unremarkable.  Add labs, Toradol, fentanyl and Robaxin and reassess.    Labs are unremarkable, D-dimer negative.  No clinical sequela for DVT, low risk otherwise for PE.    Pain improved with ED intervention.    HTN/IDDM FOLLOW UP:  The patient is referred to a primary physician for blood pressure management, diabetic screening, and for all other preventive health concerns      ADDITIONAL PROBLEM LIST    DISPOSITION AND DISCUSSIONS  ED evaluation for right sided chest pain or rib pain does seem to be musculoskeletal in origin given onset with a twist and cough.  Pinpoint reproducible discomfort on exam without other cutaneous or chest wall changes.  Rib series and chest x-ray unremarkable yesterday.  Labs within normal limits without leukocytosis.  No respiratory symptoms to suggest infection.  No clinical sequela for DVT, dimer negative, doubt PE.  No other chest pain, shortness of breath, palpitations, symptomatology is atypical for ACS.  Heart score 2.  Abdominal exam is benign, no right upper quadrant tenderness.  Reproducible right chest wall pain without other flank  pain, abdominal pain or urinary symptoms, ureteral colic seems less likely.  Pain is much better controlled after Toradol, Robaxin and fentanyl.  Will continue similar medications at home short-term for pain control.    Discussion of management with other [facility] or appropriate source(s): None     Escalation of care considered, and ultimately not performed:diagnostic imaging and acute inpatient care management, however at this time, the patient is most appropriate for outpatient management    Decision tools and prescription drugs considered including, but not limited to: Pain Medications as below for symptoms previously uncontrolled with NSAIDs, muscle relaxer .    In prescribing controlled substances to this patient, I certify that I have obtained and reviewed the medical history of Christal Roblero. I have also made a good stone effort to obtain applicable records from other providers who have treated the patient and records did not demonstrate any increased risk of substance abuse that would prevent me from prescribing controlled substances.     I have conducted a physical exam and documented it. I have reviewed Ms. Roblero’s prescription history as maintained by the [place] Prescription Monitoring Program.     I have assessed the patient’s risk for abuse, dependency, and addiction using the validated Opioid Risk Tool available at https://www.mdcalc.com/junrhg-oijr-gigm-ort-narcotic-abuse.     Given the above, I believe the benefits of controlled substance therapy outweigh the risks. The reasons for prescribing controlled substances include non-narcotic, oral analgesic alternatives have been inadequate for pain control. Accordingly, I have discussed the risk and benefits, treatment plan, and alternative therapies with the patient.     The patient is stable for discharge home, anticipatory guidance provided, Motrin or naproxen as directed for discomfort, oxycodone for breakthrough pain, close follow-up is encouraged  and strict return instructions have been discussed. Patient is agreeable to the disposition and plan.      FINAL DIAGNOSIS  1. Right flank pain    2. Chest wall pain           Electronically signed by: Laurita Cortes D.O., 3/19/2024 9:21 AM

## 2024-03-19 NOTE — ED NOTES
PIV placed, blood work collected & sent to lab, Pt medicated per MAR, Pt denied having any needs at this time, no distress noted;

## 2024-03-19 NOTE — ED TRIAGE NOTES
"Chief Complaint   Patient presents with    Rib Pain     Pt c/o Rt sided rib pain that hurts all the way to her spine, started last Tuesday, denied having any trauma, Pt stated that its hard to take deep breaths; Pt stated that she went to  yesterday and rib xrays were negative for any Fx's;      ED Triage Vitals [03/19/24 0716]   Enc Vitals Group      Blood Pressure (!) 148/96      Pulse 92      Respiration 18      Temperature 36.3 °C (97.3 °F)      Temp src Temporal      Pulse Oximetry 98 %      Weight 97.6 kg (215 lb 2.7 oz)      Height 1.676 m (5' 6\")      Head Circumference       Peak Flow       Pain Score       Pain Loc       Pain Edu?       Excl. in GC?       "

## 2024-03-19 NOTE — DISCHARGE INSTRUCTIONS
Follow-up with primary care this week for reevaluation, medication management and specialty referral if symptoms persist.    Motrin 600 mg every 6 hours OR Naprosyn twice daily, but do not take both of these medications on the same day.  Continue home medications including muscle relaxer as previously prescribed.  Add oxycodone every 6 hours as needed for breakthrough pain.    Activity as tolerated.    Return to the emergency department for persistent or worsening pain, chest pain, shortness of breath, syncope, fever, vomiting or other new concerns.

## 2024-03-19 NOTE — ED NOTES
Patient is stable for discharge at this time, anticipatory guidance provided, close follow-up is encouraged, and ED return instructions have been detailed. Patient and family are both agreeable to the disposition and plan and discharged home in ambulatory state and in good condition.     Rx education provided, Pt verbalized understanding; Narcotic paper signed by Pt;

## 2024-05-02 ENCOUNTER — TELEPHONE (OUTPATIENT)
Dept: MEDICAL GROUP | Facility: LAB | Age: 44
End: 2024-05-02
Payer: COMMERCIAL

## 2024-05-02 DIAGNOSIS — Z30.42 ENCOUNTER FOR SURVEILLANCE OF INJECTABLE CONTRACEPTIVE: ICD-10-CM

## 2024-05-05 RX ORDER — MEDROXYPROGESTERONE ACETATE 150 MG/ML
150 INJECTION, SUSPENSION INTRAMUSCULAR ONCE
Status: COMPLETED | OUTPATIENT
Start: 2024-05-14 | End: 2024-05-14

## 2024-05-14 ENCOUNTER — NON-PROVIDER VISIT (OUTPATIENT)
Dept: MEDICAL GROUP | Facility: LAB | Age: 44
End: 2024-05-14
Payer: COMMERCIAL

## 2024-05-14 PROCEDURE — 96372 THER/PROPH/DIAG INJ SC/IM: CPT | Performed by: NURSE PRACTITIONER

## 2024-05-14 RX ADMIN — MEDROXYPROGESTERONE ACETATE 150 MG: 150 INJECTION, SUSPENSION INTRAMUSCULAR at 09:53

## 2024-05-17 DIAGNOSIS — F41.1 GENERALIZED ANXIETY DISORDER: ICD-10-CM

## 2024-05-17 NOTE — TELEPHONE ENCOUNTER
Received request via: Pharmacy    Was the patient seen in the last year in this department? Yes  7/5/23  Does the patient have an active prescription (recently filled or refills available) for medication(s) requested? No    Pharmacy Name: [facility]    Does the patient have care home Plus and need 100 day supply (blood pressure, diabetes and cholesterol meds only)? Medication is not for cholesterol, blood pressure or diabetes

## 2024-05-20 RX ORDER — VENLAFAXINE HYDROCHLORIDE 150 MG/1
150 CAPSULE, EXTENDED RELEASE ORAL DAILY
Qty: 90 CAPSULE | Refills: 0 | Status: SHIPPED | OUTPATIENT
Start: 2024-05-20

## 2024-06-08 DIAGNOSIS — F41.1 GENERALIZED ANXIETY DISORDER: ICD-10-CM

## 2024-06-08 RX ORDER — VENLAFAXINE HYDROCHLORIDE 150 MG/1
150 CAPSULE, EXTENDED RELEASE ORAL DAILY
Qty: 90 CAPSULE | Refills: 0 | Status: CANCELLED | OUTPATIENT
Start: 2024-06-08

## 2024-06-10 DIAGNOSIS — F41.1 GENERALIZED ANXIETY DISORDER: ICD-10-CM

## 2024-06-10 RX ORDER — VENLAFAXINE HYDROCHLORIDE 150 MG/1
150 CAPSULE, EXTENDED RELEASE ORAL DAILY
Qty: 90 CAPSULE | Refills: 0 | Status: SHIPPED | OUTPATIENT
Start: 2024-06-10

## 2024-06-10 RX ORDER — VENLAFAXINE HYDROCHLORIDE 75 MG/1
CAPSULE, EXTENDED RELEASE ORAL
Qty: 90 CAPSULE | Refills: 0 | Status: SHIPPED | OUTPATIENT
Start: 2024-06-10

## 2024-06-10 NOTE — TELEPHONE ENCOUNTER
Received request via: Pharmacy    Was the patient seen in the last year in this department? Yes  LOV : 7/5/2023  Does the patient have an active prescription (recently filled or refills available) for medication(s) requested? No    Pharmacy Name: [facility]     Does the patient have skilled nursing Plus and need 100 day supply (blood pressure, diabetes and cholesterol meds only)? Patient does not have SCP

## 2024-08-20 ENCOUNTER — OFFICE VISIT (OUTPATIENT)
Dept: MEDICAL GROUP | Facility: LAB | Age: 44
End: 2024-08-20
Payer: COMMERCIAL

## 2024-08-20 VITALS
SYSTOLIC BLOOD PRESSURE: 120 MMHG | HEIGHT: 66 IN | OXYGEN SATURATION: 97 % | RESPIRATION RATE: 12 BRPM | TEMPERATURE: 97.3 F | DIASTOLIC BLOOD PRESSURE: 78 MMHG | HEART RATE: 80 BPM | WEIGHT: 220 LBS | BODY MASS INDEX: 35.36 KG/M2

## 2024-08-20 DIAGNOSIS — Z30.42 ENCOUNTER FOR SURVEILLANCE OF INJECTABLE CONTRACEPTIVE: ICD-10-CM

## 2024-08-20 DIAGNOSIS — Z13.820 SCREENING FOR OSTEOPOROSIS: ICD-10-CM

## 2024-08-20 DIAGNOSIS — Z30.42 ENCOUNTER FOR MANAGEMENT AND INJECTION OF DEPO-PROVERA: ICD-10-CM

## 2024-08-20 DIAGNOSIS — F41.1 GENERALIZED ANXIETY DISORDER: ICD-10-CM

## 2024-08-20 DIAGNOSIS — Z12.31 SCREENING MAMMOGRAM FOR BREAST CANCER: ICD-10-CM

## 2024-08-20 DIAGNOSIS — F32.A MILD DEPRESSION: ICD-10-CM

## 2024-08-20 LAB
POCT INT CON NEG: NEGATIVE
POCT INT CON POS: POSITIVE
POCT URINE PREGNANCY TEST: NEGATIVE

## 2024-08-20 PROCEDURE — 96372 THER/PROPH/DIAG INJ SC/IM: CPT | Performed by: NURSE PRACTITIONER

## 2024-08-20 PROCEDURE — 3078F DIAST BP <80 MM HG: CPT | Performed by: NURSE PRACTITIONER

## 2024-08-20 PROCEDURE — 81025 URINE PREGNANCY TEST: CPT | Performed by: NURSE PRACTITIONER

## 2024-08-20 PROCEDURE — 99214 OFFICE O/P EST MOD 30 MIN: CPT | Mod: 25 | Performed by: NURSE PRACTITIONER

## 2024-08-20 PROCEDURE — 3074F SYST BP LT 130 MM HG: CPT | Performed by: NURSE PRACTITIONER

## 2024-08-20 RX ORDER — VENLAFAXINE 75 MG/1
75 TABLET ORAL DAILY
Qty: 30 TABLET | Refills: 2 | Status: SHIPPED | OUTPATIENT
Start: 2024-08-20

## 2024-08-20 RX ORDER — DESVENLAFAXINE 25 MG/1
25 TABLET, EXTENDED RELEASE ORAL DAILY
Qty: 30 TABLET | Refills: 5 | Status: SHIPPED | OUTPATIENT
Start: 2024-08-20

## 2024-08-20 RX ORDER — MEDROXYPROGESTERONE ACETATE 150 MG/ML
150 INJECTION, SUSPENSION INTRAMUSCULAR ONCE
Status: COMPLETED | OUTPATIENT
Start: 2024-08-20 | End: 2024-08-20

## 2024-08-20 RX ADMIN — MEDROXYPROGESTERONE ACETATE 150 MG: 150 INJECTION, SUSPENSION INTRAMUSCULAR at 10:56

## 2024-08-20 ASSESSMENT — PATIENT HEALTH QUESTIONNAIRE - PHQ9: CLINICAL INTERPRETATION OF PHQ2 SCORE: 0

## 2024-08-20 ASSESSMENT — FIBROSIS 4 INDEX: FIB4 SCORE: 0.48

## 2024-08-20 NOTE — PROGRESS NOTES
"Verbal consent was acquired by the patient to use [employer] ambient listening note generation during this visit Yes      Subjective   Christal Roblero is a 44 y.o. female who presents for f/u  History of Present Illness  The patient is a 44-year-old established female here for a follow-up visit. She was last seen 1 year ago.    She reports that her current medication, venlafaxine XR (75 mg plus 150 mg), has not been effective in managing her symptoms for the past few months. She experiences increased anxiety and occasional depressive episodes, although she feels well at present in terms of depressive symptoms. About a month ago, she felt depressed, which she believes may have been due to her Depo-Provera injection being due.    She had an adverse reaction to Prozac, which initially caused excessive sleepiness and later seemed ineffective. While on Prozac, she noticed her speech became unusually rapid, often outpacing her thoughts. She felt as if she lost her mental filter, leading to impulsive speech. This lack of control was a significant factor in her decision to discontinue Prozac. She has previously taken Zoloft but does not recall its effects.    She suspects she may have ADHD as she struggles to focus on one task at a time, often starting multiple tasks and leaving them unfinished. She found that Prozac exacerbated this issue, making it even more difficult for her to maintain focus and to hold in racing thoughts / prevent blurting out what she was thinking.    She continues to use Depo-Provera and has not experienced any menstrual periods while on this medication. She expresses no concerns about sexually transmitted diseases.    Review of Systems  Neg except hpi  Objective   /78 (BP Location: Right arm, Patient Position: Sitting, BP Cuff Size: Large adult)   Pulse 80   Temp 36.3 °C (97.3 °F)   Resp 12   Ht 1.676 m (5' 6\")   Wt 99.8 kg (220 lb)   SpO2 97%   Physical Exam  Gen: NAD  Resp: " nonlabored.  Able to speak in full sentences  Psy: pleasant / cooperative.   Neuro:  Alert and oriented x 3      Results  Laboratory Studies  Kidneys, liver, electrolytes, blood counts, and pancreas tests were all normal 3/2024       Assessment & Plan  1. Anxiety.  Her symptoms of anxiety are not adequately managed with her current medication regimen of venlafaxine 75 mg + 150 mg XR. She reports increased anxiety over the last couple of months.  Encouraged her to change to Pristiq although we discussed that we will have to slowly taper off of venlafaxine utilizing an immediate release 75 mg of venlafaxine to prevent negative side effects of coming off of this medication.  A prescription for Pristiq 25 mg has been provided, and a detailed plan to taper off venlafaxine over the next 8 to 12 weeks has been discussed. She is advised to monitor for any withdrawal symptoms such as brain zaps, nausea, vomiting, or dizziness, and to adjust the dosage of immediate release venlafaxine as needed.  She will provide updates on her progress and any changes in her symptoms via [employer].  Encouraged her to message me within the next 4 weeks regarding how she is feeling and subsequent 4 weeks after that while she is on Pristiq alone as it will need to increase this dosage over time most likely.    2. Depression.  As above.  Denies major depression or SI.    3.  Depo-Provera use:  She understands that long-term use of Depo-Provera could lead to osteoporosis.  Last bone density was 2019 and within normal limits.  Recommend updated bone density.  Encouraged calcium and vitamin D.  Discussed weightbearing exercise.  Pt counseled on birth control.   Risks, benefits and complications from hormone use reviewed. Failure rates discussed. Back up contraception and STD prevention discussed.                   Please note that this dictation was created using voice recognition software. I have made every reasonable attempt to correct obvious  errors, but I expect that there are errors of grammar and possibly content that I did not discover before finalizing the note.

## 2024-08-20 NOTE — PATIENT INSTRUCTIONS
Continue 150 mg XR effexor for 2-4 weeks but add on only 1/2 of a 75 mg tablet (just sent to your pharmacy).  Then drop the 1/2 of a 75 mg to every other day for one week, then stop (as an add on).   Then we will work on the 150 mg capsule by replacing it with 1.5 of the 75 mg immediate release tablets for two weeks, then you may be able to stop b/c pristiq will be therapeutic in your system but we will need to increase that to 50 mg when you are almost off effexor. Stay in touch every few weeks.

## 2024-09-27 ENCOUNTER — HOSPITAL ENCOUNTER (OUTPATIENT)
Dept: RADIOLOGY | Facility: MEDICAL CENTER | Age: 44
End: 2024-09-27
Attending: NURSE PRACTITIONER
Payer: COMMERCIAL

## 2024-09-27 DIAGNOSIS — Z13.820 SCREENING FOR OSTEOPOROSIS: ICD-10-CM

## 2024-09-27 PROCEDURE — 77080 DXA BONE DENSITY AXIAL: CPT

## 2024-11-05 ENCOUNTER — NON-PROVIDER VISIT (OUTPATIENT)
Dept: MEDICAL GROUP | Facility: LAB | Age: 44
End: 2024-11-05
Payer: COMMERCIAL

## 2024-11-05 DIAGNOSIS — Z30.42 ENCOUNTER FOR SURVEILLANCE OF INJECTABLE CONTRACEPTIVE: ICD-10-CM

## 2024-11-05 PROCEDURE — 96372 THER/PROPH/DIAG INJ SC/IM: CPT | Performed by: NURSE PRACTITIONER

## 2024-11-05 RX ORDER — MEDROXYPROGESTERONE ACETATE 150 MG/ML
150 INJECTION, SUSPENSION INTRAMUSCULAR ONCE
Status: COMPLETED | OUTPATIENT
Start: 2024-11-05 | End: 2024-11-05

## 2024-11-05 RX ADMIN — MEDROXYPROGESTERONE ACETATE 150 MG: 150 INJECTION, SUSPENSION INTRAMUSCULAR at 08:18

## 2024-11-05 NOTE — PROGRESS NOTES
Christal Roblero is a 44 y.o. here for a Depo Provera Injection.     Date of last Depo Provera Injection: 08/20/24  Current date within therapeutic range?: Yes   Urine pregnancy test done (needed if out of date range): no  Date of last office visit:08/20/24  Date of last pap (if > 21 years old)/ GYN exam: 09/06/22  Dx: Contraceptive use    Patient tolerated injection and no adverse effects were observed or reported: Yes    # of Administrations remaining in MAR:   Next injection due between Jan 27 and Feb 10.

## 2025-01-27 ENCOUNTER — NON-PROVIDER VISIT (OUTPATIENT)
Dept: MEDICAL GROUP | Facility: LAB | Age: 45
End: 2025-01-27
Payer: COMMERCIAL

## 2025-01-27 DIAGNOSIS — Z30.42 ENCOUNTER FOR SURVEILLANCE OF INJECTABLE CONTRACEPTIVE: ICD-10-CM

## 2025-01-27 PROCEDURE — 96372 THER/PROPH/DIAG INJ SC/IM: CPT | Performed by: NURSE PRACTITIONER

## 2025-01-27 RX ORDER — MEDROXYPROGESTERONE ACETATE 150 MG/ML
150 INJECTION, SUSPENSION INTRAMUSCULAR ONCE
Status: COMPLETED | OUTPATIENT
Start: 2025-01-27 | End: 2025-01-27

## 2025-01-27 RX ADMIN — MEDROXYPROGESTERONE ACETATE 150 MG: 150 INJECTION, SUSPENSION INTRAMUSCULAR at 08:16

## 2025-01-27 NOTE — PROGRESS NOTES
Christal Roblero is a 44 y.o. here for a Depo Provera Injection.     Date of last Depo Provera Injection: 11/5/2024   Current date within therapeutic range?: Yes   Urine pregnancy test done (needed if out of date range): not performed  Date of last office visit:8/20/2024  Date of last pap (if > 21 years old)/ GYN exam:   Dx:       Patient tolerated injection and no adverse effects were observed or reported: Yes    # of Administrations remaining in MAR: 0  Next injection due between April 14 and April 28.

## 2025-01-30 ENCOUNTER — OFFICE VISIT (OUTPATIENT)
Dept: URGENT CARE | Facility: CLINIC | Age: 45
End: 2025-01-30
Payer: COMMERCIAL

## 2025-01-30 ENCOUNTER — APPOINTMENT (OUTPATIENT)
Dept: RADIOLOGY | Facility: IMAGING CENTER | Age: 45
End: 2025-01-30
Attending: STUDENT IN AN ORGANIZED HEALTH CARE EDUCATION/TRAINING PROGRAM
Payer: COMMERCIAL

## 2025-01-30 VITALS
SYSTOLIC BLOOD PRESSURE: 126 MMHG | OXYGEN SATURATION: 95 % | HEART RATE: 78 BPM | TEMPERATURE: 98.8 F | DIASTOLIC BLOOD PRESSURE: 70 MMHG | HEIGHT: 66 IN | RESPIRATION RATE: 18 BRPM | BODY MASS INDEX: 36.96 KG/M2 | WEIGHT: 230 LBS

## 2025-01-30 DIAGNOSIS — M25.551 RIGHT HIP PAIN: ICD-10-CM

## 2025-01-30 PROCEDURE — 73502 X-RAY EXAM HIP UNI 2-3 VIEWS: CPT | Mod: TC,FY,RT | Performed by: STUDENT IN AN ORGANIZED HEALTH CARE EDUCATION/TRAINING PROGRAM

## 2025-01-30 RX ORDER — KETOROLAC TROMETHAMINE 15 MG/ML
15 INJECTION, SOLUTION INTRAMUSCULAR; INTRAVENOUS ONCE
Status: COMPLETED | OUTPATIENT
Start: 2025-01-30 | End: 2025-01-30

## 2025-01-30 RX ORDER — IBUPROFEN 200 MG
800 TABLET ORAL EVERY 6 HOURS PRN
COMMUNITY

## 2025-01-30 RX ORDER — NAPROXEN SODIUM 220 MG/1
440 TABLET, FILM COATED ORAL 2 TIMES DAILY WITH MEALS
COMMUNITY

## 2025-01-30 RX ADMIN — KETOROLAC TROMETHAMINE 15 MG: 15 INJECTION, SOLUTION INTRAMUSCULAR; INTRAVENOUS at 13:43

## 2025-01-30 ASSESSMENT — ENCOUNTER SYMPTOMS
FEVER: 0
CHILLS: 0

## 2025-01-30 ASSESSMENT — FIBROSIS 4 INDEX: FIB4 SCORE: 0.48

## 2025-01-30 NOTE — PROGRESS NOTES
Subjective:   Christal Roblero is a 44 y.o. female who presents for Hip Pain (X 2 months, worse last 2 weeks, Rt side of hip/groin area pain, feeling that it pops, hard time walking, OTC medications will not help)      HPI:  44-year-old female presents with right hip pain that has been gradually getting worse over the past 2 months.  She denies any strain or injury to the area.  She reports it was very slowly increasing but acutely in the past 2 weeks significant pain and discomfort difficulty walking.  Difficulty with external rotation of her hip such as crossing her legs she has felt some clicking and popping when she tries to bend or move.  She has been trying over-the-counter NSAIDs and Tylenol with no improvement.  She reports the pain seems to radiate down into towards her groin and slightly down her leg on the lateral side but denies any numbness tingling.  She reports decree strength in the side secondary to her pain.    Review of Systems   Constitutional:  Negative for chills and fever.   Musculoskeletal:  Positive for joint pain.       Medications:    cyclobenzaprine  Desvenlafaxine Succinate ER Tb24  ibuprofen Tabs  lidocaine Ptch  naproxen  rizatriptan  venlafaxine  venlafaxine Tabs    Allergies: [place] (diagnostic) and Nkda [no known drug allergy]    Problem List: Christal Roblero does not have any pertinent problems on file.    Surgical History:  Past Surgical History:   Procedure Laterality Date    DILATION AND CURETTAGE N/A 7/26/2019    Procedure: DILATION AND EVACUATION 1ST TRIMESTER;  Surgeon: Sarah Garcia M.D.;  Location: SURGERY [facility];  Service: Gynecology       Past Social Hx: Christal Roblero  reports that she has never smoked. She has never used smokeless tobacco. She reports that she does not currently use alcohol. She reports that she does not currently use drugs.     Past Family Hx:  Christal Roblero family history includes Heart Disease in her mother; Thyroid in  "her sister.     Problem list, medications, and allergies reviewed by myself today in Epic.     Objective:     /70 (BP Location: Left arm, Patient Position: Sitting, BP Cuff Size: Adult)   Pulse 78   Temp 37.1 °C (98.8 °F) (Temporal)   Resp 18   Ht 1.676 m (5' 6\")   Wt 104 kg (230 lb)   SpO2 95%   Breastfeeding No   BMI 37.12 kg/m²     Physical Exam  Constitutional:       Appearance: Normal appearance.   HENT:      Head: Normocephalic and atraumatic.   Cardiovascular:      Rate and Rhythm: Normal rate and regular rhythm.      Pulses: Normal pulses.      Heart sounds: Normal heart sounds.   Pulmonary:      Effort: Pulmonary effort is normal.      Breath sounds: Normal breath sounds.   Musculoskeletal:      Left hip: Normal. No deformity, tenderness, bony tenderness or crepitus. Normal range of motion.      Comments: Right hip with severe discomfort and pain reproduced during Suleiman. patient has decreased ROM secondary to pain.  Decreased strength with flexion and extension secondary to pain.  Full sensation down the side of her leg and her lower extremities.  No knee discomfort on the right side.  Tenderness to palpation near SI.  Increased pain with knee compression test   Neurological:      Mental Status: She is alert.       Assessment/Plan:     Diagnosis and associated orders:     1. Right hip pain  DX-HIP-COMPLETE - UNILATERAL 2+ RIGHT    Referral to Sports Medicine         Comments/MDM:     1. Right hip pain  Patient extremely tender during Suleiman examination, positive compression test at knee as well.  No bony deformities noted.  X-ray mild osteoarthritis  - Recommend evaluation by sports medicine.  Patient instructed to call his portion of the offices today and get an appointment as soon as possible  -Will give 15 mg Toradol injection in next day patient instructed not to take any additional Aleve today.  And then can increase her Aleve to 440 mg twice a day starting tomorrow.  - DX-HIP-COMPLETE - " UNILATERAL 2+ RIGHT; Future    HISTORY/REASON FOR EXAM:  Atraumatic Pelvic/Hip Pain. Symptoms for 2 weeks.     TECHNIQUE/EXAM DESCRIPTION AND NUMBER OF VIEWS:  2 views of the RIGHT hip.     COMPARISON: None     FINDINGS:  No acute fracture or subluxation is seen.     There is mild right hip joint space narrowing with femoral head/neck junction spurring, bump formation     Unremarkable symphysis pubis and SI joints     IMPRESSION:     Mild right hip osteoarthritis       Differential diagnosis, natural history, supportive care, and indications for immediate follow-up discussed.    Advised the patient to follow-up with the primary care physician for recheck, reevaluation, and consideration of further management.    Please note that this dictation was created using voice recognition software. I have made a reasonable attempt to correct obvious errors, but I expect that there are errors of grammar and possibly content that I did not discover before finalizing the note.    Phillip Mayer M.D.

## 2025-02-04 SDOH — HEALTH STABILITY: PHYSICAL HEALTH: ON AVERAGE, HOW MANY MINUTES DO YOU ENGAGE IN EXERCISE AT THIS LEVEL?: 60 MIN

## 2025-02-04 SDOH — HEALTH STABILITY: PHYSICAL HEALTH: ON AVERAGE, HOW MANY DAYS PER WEEK DO YOU ENGAGE IN MODERATE TO STRENUOUS EXERCISE (LIKE A BRISK WALK)?: 2 DAYS

## 2025-02-04 SDOH — ECONOMIC STABILITY: TRANSPORTATION INSECURITY
IN THE PAST 12 MONTHS, HAS LACK OF TRANSPORTATION KEPT YOU FROM MEETINGS, WORK, OR FROM GETTING THINGS NEEDED FOR DAILY LIVING?: NO

## 2025-02-04 SDOH — ECONOMIC STABILITY: INCOME INSECURITY: IN THE LAST 12 MONTHS, WAS THERE A TIME WHEN YOU WERE NOT ABLE TO PAY THE MORTGAGE OR RENT ON TIME?: NO

## 2025-02-04 SDOH — ECONOMIC STABILITY: FOOD INSECURITY: WITHIN THE PAST 12 MONTHS, THE FOOD YOU BOUGHT JUST DIDN'T LAST AND YOU DIDN'T HAVE MONEY TO GET MORE.: NEVER TRUE

## 2025-02-04 SDOH — ECONOMIC STABILITY: INCOME INSECURITY: HOW HARD IS IT FOR YOU TO PAY FOR THE VERY BASICS LIKE FOOD, HOUSING, MEDICAL CARE, AND HEATING?: SOMEWHAT HARD

## 2025-02-04 SDOH — ECONOMIC STABILITY: FOOD INSECURITY: WITHIN THE PAST 12 MONTHS, YOU WORRIED THAT YOUR FOOD WOULD RUN OUT BEFORE YOU GOT MONEY TO BUY MORE.: NEVER TRUE

## 2025-02-04 SDOH — ECONOMIC STABILITY: TRANSPORTATION INSECURITY
IN THE PAST 12 MONTHS, HAS THE LACK OF TRANSPORTATION KEPT YOU FROM MEDICAL APPOINTMENTS OR FROM GETTING MEDICATIONS?: NO

## 2025-02-04 SDOH — ECONOMIC STABILITY: TRANSPORTATION INSECURITY
IN THE PAST 12 MONTHS, HAS LACK OF RELIABLE TRANSPORTATION KEPT YOU FROM MEDICAL APPOINTMENTS, MEETINGS, WORK OR FROM GETTING THINGS NEEDED FOR DAILY LIVING?: NO

## 2025-02-04 SDOH — HEALTH STABILITY: MENTAL HEALTH
STRESS IS WHEN SOMEONE FEELS TENSE, NERVOUS, ANXIOUS, OR CAN'T SLEEP AT NIGHT BECAUSE THEIR MIND IS TROUBLED. HOW STRESSED ARE YOU?: TO SOME EXTENT

## 2025-02-04 SDOH — ECONOMIC STABILITY: HOUSING INSECURITY
IN THE LAST 12 MONTHS, WAS THERE A TIME WHEN YOU DID NOT HAVE A STEADY PLACE TO SLEEP OR SLEPT IN A SHELTER (INCLUDING NOW)?: NO

## 2025-02-04 ASSESSMENT — LIFESTYLE VARIABLES
SKIP TO QUESTIONS 9-10: 1
HOW OFTEN DO YOU HAVE SIX OR MORE DRINKS ON ONE OCCASION: NEVER
AUDIT-C TOTAL SCORE: 1
HOW MANY STANDARD DRINKS CONTAINING ALCOHOL DO YOU HAVE ON A TYPICAL DAY: 1 OR 2
HOW OFTEN DO YOU HAVE A DRINK CONTAINING ALCOHOL: MONTHLY OR LESS

## 2025-02-04 ASSESSMENT — SOCIAL DETERMINANTS OF HEALTH (SDOH)
WITHIN THE PAST 12 MONTHS, YOU WORRIED THAT YOUR FOOD WOULD RUN OUT BEFORE YOU GOT THE MONEY TO BUY MORE: NEVER TRUE
IN A TYPICAL WEEK, HOW MANY TIMES DO YOU TALK ON THE PHONE WITH FAMILY, FRIENDS, OR NEIGHBORS?: MORE THAN THREE TIMES A WEEK
IN THE PAST 12 MONTHS, HAS THE ELECTRIC, GAS, OIL, OR WATER COMPANY THREATENED TO SHUT OFF SERVICE IN YOUR HOME?: NO
HOW OFTEN DO YOU GET TOGETHER WITH FRIENDS OR RELATIVES?: ONCE A WEEK
HOW OFTEN DO YOU HAVE SIX OR MORE DRINKS ON ONE OCCASION: NEVER
HOW OFTEN DO YOU ATTENT MEETINGS OF THE CLUB OR ORGANIZATION YOU BELONG TO?: NEVER
HOW HARD IS IT FOR YOU TO PAY FOR THE VERY BASICS LIKE FOOD, HOUSING, MEDICAL CARE, AND HEATING?: SOMEWHAT HARD
HOW OFTEN DO YOU ATTENT MEETINGS OF THE CLUB OR ORGANIZATION YOU BELONG TO?: NEVER
DO YOU BELONG TO ANY CLUBS OR ORGANIZATIONS SUCH AS CHURCH GROUPS UNIONS, FRATERNAL OR ATHLETIC GROUPS, OR SCHOOL GROUPS?: NO
HOW MANY DRINKS CONTAINING ALCOHOL DO YOU HAVE ON A TYPICAL DAY WHEN YOU ARE DRINKING: 1 OR 2
HOW OFTEN DO YOU HAVE A DRINK CONTAINING ALCOHOL: MONTHLY OR LESS
HOW OFTEN DO YOU ATTEND CHURCH OR RELIGIOUS SERVICES?: NEVER
HOW OFTEN DO YOU ATTEND CHURCH OR RELIGIOUS SERVICES?: NEVER
IN A TYPICAL WEEK, HOW MANY TIMES DO YOU TALK ON THE PHONE WITH FAMILY, FRIENDS, OR NEIGHBORS?: MORE THAN THREE TIMES A WEEK
DO YOU BELONG TO ANY CLUBS OR ORGANIZATIONS SUCH AS CHURCH GROUPS UNIONS, FRATERNAL OR ATHLETIC GROUPS, OR SCHOOL GROUPS?: NO
HOW OFTEN DO YOU GET TOGETHER WITH FRIENDS OR RELATIVES?: ONCE A WEEK

## 2025-02-05 ENCOUNTER — APPOINTMENT (OUTPATIENT)
Dept: MEDICAL GROUP | Facility: LAB | Age: 45
End: 2025-02-05
Payer: COMMERCIAL

## 2025-02-05 ENCOUNTER — OFFICE VISIT (OUTPATIENT)
Dept: SPORTS MEDICINE | Facility: OTHER | Age: 45
End: 2025-02-05
Attending: STUDENT IN AN ORGANIZED HEALTH CARE EDUCATION/TRAINING PROGRAM
Payer: COMMERCIAL

## 2025-02-05 VITALS
TEMPERATURE: 97.7 F | DIASTOLIC BLOOD PRESSURE: 78 MMHG | OXYGEN SATURATION: 96 % | BODY MASS INDEX: 36.96 KG/M2 | SYSTOLIC BLOOD PRESSURE: 128 MMHG | HEART RATE: 79 BPM | WEIGHT: 230 LBS | HEIGHT: 66 IN

## 2025-02-05 DIAGNOSIS — M25.551 RIGHT HIP PAIN: ICD-10-CM

## 2025-02-05 PROCEDURE — 3074F SYST BP LT 130 MM HG: CPT | Performed by: FAMILY MEDICINE

## 2025-02-05 PROCEDURE — 3078F DIAST BP <80 MM HG: CPT | Performed by: FAMILY MEDICINE

## 2025-02-05 PROCEDURE — 99213 OFFICE O/P EST LOW 20 MIN: CPT | Performed by: FAMILY MEDICINE

## 2025-02-05 RX ORDER — DICLOFENAC SODIUM 75 MG/1
75 TABLET, DELAYED RELEASE ORAL 2 TIMES DAILY
Qty: 60 TABLET | Refills: 0 | Status: SHIPPED | OUTPATIENT
Start: 2025-02-05

## 2025-02-05 ASSESSMENT — FIBROSIS 4 INDEX: FIB4 SCORE: 0.48

## 2025-02-05 ASSESSMENT — ENCOUNTER SYMPTOMS: TINGLING: 1

## 2025-02-05 NOTE — PROGRESS NOTES
"Subjective:     Christal Roblero is a 44 y.o. female who presents for Hip Pain (Right hip pain)    HPI  Pt presents for evaluation of an acute problem  Patient with right hip pain which has bothered her for about 2 months  Pain has been worsening  There was no fall or injury which started the pain  Pain is in the groin, and into the buttock area   Pain is worse when crossing leg   Has some pain in the low back that radiates to the right posterior thigh and calf, but this new pain in the hip feels different     Review of Systems   Skin:  Negative for rash.   Neurological:  Positive for tingling.       PMH:  has a past medical history of Allergy, Anxiety, Gout, Headache(784.0), History of unintended awareness under general anesthesia (7/26/2019), and Migraine.  MEDS:   Current Outpatient Medications:     diclofenac DR (VOLTAREN) 75 MG Tablet Delayed Response, Take 1 Tablet by mouth 2 times a day., Disp: 60 Tablet, Rfl: 0  ALLERGIES:   Allergies   Allergen Reactions    [place] (Diagnostic) Itching     Throat Itchiness    Nkda [No Known Drug Allergy] Itching     Fruit [employer] Vegetables (Throat Itchiness)       SURGHX:   Past Surgical History:   Procedure Laterality Date    DILATION AND CURETTAGE N/A 7/26/2019    Procedure: DILATION AND EVACUATION 1ST TRIMESTER;  Surgeon: Sarah Garcia M.D.;  Location: SURGERY [facility];  Service: Gynecology     SOCHX:  reports that she has never smoked. She has never used smokeless tobacco. She reports that she does not currently use alcohol. She reports that she does not currently use drugs.     Objective:   /78 (BP Location: Right arm, Patient Position: Sitting)   Pulse 79   Temp 36.5 °C (97.7 °F)   Ht 1.676 m (5' 6\")   Wt 104 kg (230 lb)   SpO2 96%   BMI 37.12 kg/m²     Physical Exam  Constitutional:       General: She is not in acute distress.     Appearance: She is well-developed. She is not diaphoretic.   Pulmonary:      Effort: Pulmonary effort is normal. "   Neurological:      Mental Status: She is alert.     Right hip:  General: No gross deformity  ROM: flexion 120 degrees, ER 60, IR 30 (+pain)   Palpation: No TTP over greater trochanter, iliopsoas tendon, gluteal muscles   Strength: 5/5 throughout   Special tests: Suleiman +, Fadir +, Labral shear +  Neuro: Sensation intact     Assessment/Plan:   Assessment    1. Right hip pain    Other orders  - diclofenac DR (VOLTAREN) 75 MG Tablet Delayed Response; Take 1 Tablet by mouth 2 times a day.  Dispense: 60 Tablet; Refill: 0    Patient with right hip pain.  Does have some chronic low back pain and radiculopathy, but suspect that the majority of her pain is actually from the intra-articular hip.  Does have the early stages of cam lesion developing and some mild degenerative changes on x-ray.  Recommended diclofenac, home exercise program, and other supportive care measures for the next few weeks.  If she is self resolves, no further follow-up required.  If making some improvements but then plateaus, would likely plan physical therapy.  If having no improvement, would recommend further evaluation with MRI and potentially intra-articular injection.

## 2025-03-05 ENCOUNTER — PATIENT MESSAGE (OUTPATIENT)
Dept: MEDICAL GROUP | Facility: LAB | Age: 45
End: 2025-03-05
Payer: COMMERCIAL

## 2025-03-05 DIAGNOSIS — F41.1 GENERALIZED ANXIETY DISORDER: ICD-10-CM

## 2025-03-06 RX ORDER — VENLAFAXINE HYDROCHLORIDE 75 MG/1
CAPSULE, EXTENDED RELEASE ORAL
Qty: 30 CAPSULE | Refills: 0 | Status: SHIPPED | OUTPATIENT
Start: 2025-03-06 | End: 2025-03-31 | Stop reason: SDUPTHER

## 2025-03-10 ENCOUNTER — APPOINTMENT (OUTPATIENT)
Dept: MEDICAL GROUP | Facility: LAB | Age: 45
End: 2025-03-10
Payer: COMMERCIAL

## 2025-03-31 ENCOUNTER — OFFICE VISIT (OUTPATIENT)
Dept: MEDICAL GROUP | Facility: LAB | Age: 45
End: 2025-03-31
Payer: COMMERCIAL

## 2025-03-31 VITALS
DIASTOLIC BLOOD PRESSURE: 82 MMHG | HEIGHT: 66 IN | OXYGEN SATURATION: 96 % | BODY MASS INDEX: 36.32 KG/M2 | TEMPERATURE: 97.8 F | WEIGHT: 226 LBS | SYSTOLIC BLOOD PRESSURE: 118 MMHG | HEART RATE: 82 BPM | RESPIRATION RATE: 12 BRPM

## 2025-03-31 DIAGNOSIS — M25.551 RIGHT HIP PAIN: ICD-10-CM

## 2025-03-31 DIAGNOSIS — G47.9 SLEEP DISTURBANCE: ICD-10-CM

## 2025-03-31 DIAGNOSIS — F41.1 GAD (GENERALIZED ANXIETY DISORDER): ICD-10-CM

## 2025-03-31 RX ORDER — VENLAFAXINE HYDROCHLORIDE 75 MG/1
CAPSULE, EXTENDED RELEASE ORAL
Qty: 90 CAPSULE | Refills: 3 | Status: SHIPPED | OUTPATIENT
Start: 2025-03-31

## 2025-03-31 RX ORDER — ALPRAZOLAM 0.25 MG
0.25 TABLET ORAL 2 TIMES DAILY PRN
Qty: 20 TABLET | Refills: 1 | Status: SHIPPED | OUTPATIENT
Start: 2025-03-31 | End: 2025-04-30

## 2025-03-31 ASSESSMENT — PATIENT HEALTH QUESTIONNAIRE - PHQ9: CLINICAL INTERPRETATION OF PHQ2 SCORE: 0

## 2025-03-31 ASSESSMENT — FIBROSIS 4 INDEX: FIB4 SCORE: 0.48

## 2025-03-31 NOTE — PROGRESS NOTES
Verbal consent was acquired by the patient to use [employer] ambient listening note generation during this visit Yes      Subjective   Christal Roblero is a 44 y.o. female who presents for anxiety / hip follow up  History of Present Illness  The patient is a 44-year-old established female here to follow up on anxiety. She was last seen in August. She messaged on [employer] [facility] [employer] on 03/05/2025 to request going back on Effexor due to stress, difficulty sleeping, and anxiety, which was restarted for her with a request to follow up today. She is currently taking 75 mg of venlafaxine.  She has been on venlafaxine for the past 3 weeks, reporting significant improvement in her condition. She experiences anxiety that escalates over time but does not report any panic attacks or feelings of claustrophobia. Her appetite remains normal, and she reports no exacerbation of sweating while on venlafaxine. She believes her current dose of venlafaxine is sufficient and does not require an increase. However, she expresses a desire to have Xanax available for occasional use during particularly stressful days.    Her sleep pattern has improved, with her mind not racing upon waking, allowing her to return to sleep. She typically gets 3 to 4 hours of consistent sleep before waking up. Despite this, she experiences fatigue during the day. She has previously tried sleep medications without success. She resumed taking Aleve PM after running out of her hip medication, which has slightly improved her sleep quality.    She sought urgent care for severe right hip pain 2/5 and was diagnosed with arthritis and spurs in her hip.  She was advised against taking ibuprofen while on her hip medication. She reports clicking and popping sounds from her hip and difficulty walking normally. She finds temporary relief when lying flat on her back but experiences pain when walking or climbing stairs. She reports no injury but mentions a habit of sitting with her  "legs crossed. She also reports knee swelling and standing on one side. She can lie on her right side but experiences deep groin pain. She has a history of lower back problems but reports no recent exacerbation. She is unable to lift her leg due to pulling sensations in her groin and leg. She has been taking Aleve or ibuprofen but reports no significant relief. She was prescribed diclofenac for her hip pain but did not note improvement.  She did not experience any stomach issues with diclofenac and was also taking an antacid.      Review of Systems  Neg except hpi    Objective   /82 (BP Location: Left arm, Patient Position: Sitting, BP Cuff Size: Large adult)   Pulse 82   Temp 36.6 °C (97.8 °F)   Resp 12   Ht 1.676 m (5' 6\")   Wt 103 kg (226 lb)   SpO2 96%   Physical Exam  Gen: NAD  Resp: nonlabored.  Able to speak in full sentences  Psy: pleasant / cooperative.   Neuro:  Alert and oriented x 3         Assessment & Plan  1. Anxiety.  Stable on effexor and prn xanax.  She understands that she should not take Xanax within 6 hours of driving a car, operating machinery or drinking alcohol.  She understands the chemically dependent nature of Xanax.    2. Insomnia.  May take diphenhydramine as needed for sleep which helps her.  Prefers to refrain from any other sleep medication.    3.  Right hip pain:  Did not respond to diclofenac.  Not responding to Aleve or Tylenol.  Pain has improved just a bit.  Plans on following up with [facility] for further treatment.  Encouraged her to continue with stretches and strengthening.  Briefly discussed the possibility of referred pain from her spine but I did encourage her to follow-up with orthopedics.  May alternate 220 mg of Aleve with 500 to 1000 mg of Tylenol every 3-6 hours.    4. Health maintenance.  She is due for a mammogram, which can be scheduled through [facility]. Her Pap smear is up to date and will be repeated in 2027.               Please note that this " dictation was created using voice recognition software. I have made every reasonable attempt to correct obvious errors, but I expect that there are errors of grammar and possibly content that I did not discover before finalizing the note.

## 2025-04-14 ENCOUNTER — NON-PROVIDER VISIT (OUTPATIENT)
Dept: MEDICAL GROUP | Facility: LAB | Age: 45
End: 2025-04-14
Payer: COMMERCIAL

## 2025-04-14 DIAGNOSIS — Z30.42 ENCOUNTER FOR MANAGEMENT AND INJECTION OF DEPO-PROVERA: ICD-10-CM

## 2025-04-14 PROCEDURE — 99999 PR NO CHARGE: CPT | Performed by: NURSE PRACTITIONER

## 2025-04-14 RX ORDER — MEDROXYPROGESTERONE ACETATE 150 MG/ML
150 INJECTION, SUSPENSION INTRAMUSCULAR ONCE
Status: COMPLETED | OUTPATIENT
Start: 2025-04-14 | End: 2025-04-14

## 2025-04-14 RX ADMIN — MEDROXYPROGESTERONE ACETATE 150 MG: 150 INJECTION, SUSPENSION INTRAMUSCULAR at 08:05

## 2025-04-14 NOTE — PROGRESS NOTES
Christal Roblero is a 44 y.o. here for a Depo Provera Injection.     Date of last Depo Provera Injection: 1/27/25  Current date within therapeutic range?: Yes   Urine pregnancy test done (needed if out of date range): not performed  Date of last office visit:3/31/25  Date of last pap (if > 21 years old)/ GYN exam: 3/31/25  Dx: Dysmenorrhea    Patient tolerated injection and no adverse effects were observed or reported: Yes    # of Administrations remaining in MAR: 0  Next injection due between 6/30 and 7/14.

## 2025-05-09 ENCOUNTER — APPOINTMENT (OUTPATIENT)
Dept: URGENT CARE | Facility: CLINIC | Age: 45
End: 2025-05-09
Payer: COMMERCIAL

## 2025-07-03 ENCOUNTER — TELEPHONE (OUTPATIENT)
Dept: MEDICAL GROUP | Facility: LAB | Age: 45
End: 2025-07-03

## 2025-07-03 ENCOUNTER — NON-PROVIDER VISIT (OUTPATIENT)
Dept: MEDICAL GROUP | Facility: LAB | Age: 45
End: 2025-07-03
Payer: COMMERCIAL

## 2025-07-03 DIAGNOSIS — Z30.42 ENCOUNTER FOR MANAGEMENT AND INJECTION OF DEPO-PROVERA: ICD-10-CM

## 2025-07-03 PROCEDURE — 96372 THER/PROPH/DIAG INJ SC/IM: CPT | Performed by: NURSE PRACTITIONER

## 2025-07-03 RX ORDER — MEDROXYPROGESTERONE ACETATE 150 MG/ML
150 INJECTION, SUSPENSION INTRAMUSCULAR ONCE
Status: CANCELLED | OUTPATIENT
Start: 2025-07-03 | End: 2025-07-03

## 2025-07-03 RX ORDER — MEDROXYPROGESTERONE ACETATE 150 MG/ML
150 INJECTION, SUSPENSION INTRAMUSCULAR ONCE
Status: COMPLETED | OUTPATIENT
Start: 2025-07-03 | End: 2025-07-03

## 2025-07-03 RX ADMIN — MEDROXYPROGESTERONE ACETATE 150 MG: 150 INJECTION, SUSPENSION INTRAMUSCULAR at 11:41

## 2025-07-03 NOTE — PROGRESS NOTES
Christal Roblero is a 45 y.o. here for a Depo Provera Injection.     Date of last Depo Provera Injection: 4/14/25  Current date within therapeutic range?: Yes   Urine pregnancy test done (needed if out of date range): not performed  Date of last office visit:3/31/25  Date of last pap (if > 21 years old)/ GYN exam: 3/31/25  Dx: Contraceptive use    Patient tolerated injection and no adverse effects were observed or reported: Yes    # of Administrations remaining in MAR: 0  Next injection due between 9/18/25 and 10/2/25.

## (undated) DEVICE — GLOVE SZ 7.5 BIOGEL PI MICRO - PF LF (50PR/BX)

## (undated) DEVICE — TUBING D & E COLLECTION SET (50EA/PK)

## (undated) DEVICE — CONTAINER SPECIMEN BAG OR - STERILE 4 OZ W/LID (100EA/CA)

## (undated) DEVICE — LACTATED RINGERS INJ 1000 ML - (14EA/CA 60CA/PF)

## (undated) DEVICE — KIT ANESTHESIA W/CIRCUIT & 3/LT BAG W/FILTER (20EA/CA)

## (undated) DEVICE — PROTECTOR ULNA NERVE - (36PR/CA)

## (undated) DEVICE — HEAD HOLDER JUNIOR/ADULT

## (undated) DEVICE — SODIUM CHL IRRIGATION 0.9% 1000ML (12EA/CA)

## (undated) DEVICE — SET LEADWIRE 5 LEAD BEDSIDE DISPOSABLE ECG (1SET OF 5/EA)

## (undated) DEVICE — KIT ROOM DECONTAMINATION

## (undated) DEVICE — GLOVE BIOGEL PI INDICATOR SZ 8.0 SURGICAL PF LF -(50/BX 4BX/CA)

## (undated) DEVICE — ELECTRODE 850 FOAM ADHESIVE - HYDROGEL RADIOTRNSPRNT (50/PK)

## (undated) DEVICE — PAD SANITARY 11IN MAXI IND WRAPPED  (12EA/PK 24PK/CA)

## (undated) DEVICE — VACURETTE 8MM CURVED 10/PKG

## (undated) DEVICE — TRAY SRGPRP PVP IOD WT PRP - (20/CA)

## (undated) DEVICE — SENSOR SPO2 NEO LNCS ADHESIVE (20/BX) SEE USER NOTES

## (undated) DEVICE — SUCTION INSTRUMENT YANKAUER BULBOUS TIP W/O VENT (50EA/CA)

## (undated) DEVICE — GLOVE BIOGEL SZ 6.5 SURGICAL PF LTX (50PR/BX 4BX/CA)

## (undated) DEVICE — GLOVE BIOGEL INDICATOR SZ 7SURGICAL PF LTX - (50/BX 4BX/CA)

## (undated) DEVICE — CANISTER SUCTION 3000ML MECHANICAL FILTER AUTO SHUTOFF MEDI-VAC NONSTERILE LF DISP  (40EA/CA)

## (undated) DEVICE — Device

## (undated) DEVICE — KIT D & C COLLECTION (10EA/PK)

## (undated) DEVICE — MASK ANESTHESIA ADULT  - (100/CA)